# Patient Record
Sex: MALE | Race: WHITE
[De-identification: names, ages, dates, MRNs, and addresses within clinical notes are randomized per-mention and may not be internally consistent; named-entity substitution may affect disease eponyms.]

---

## 2019-03-06 ENCOUNTER — HOSPITAL ENCOUNTER (INPATIENT)
Dept: HOSPITAL 97 - ER | Age: 64
LOS: 5 days | Discharge: HOME | DRG: 439 | End: 2019-03-11
Attending: FAMILY MEDICINE | Admitting: FAMILY MEDICINE
Payer: SELF-PAY

## 2019-03-06 DIAGNOSIS — K85.20: Primary | ICD-10-CM

## 2019-03-06 DIAGNOSIS — J44.9: ICD-10-CM

## 2019-03-06 DIAGNOSIS — K44.9: ICD-10-CM

## 2019-03-06 DIAGNOSIS — I10: ICD-10-CM

## 2019-03-06 DIAGNOSIS — K76.0: ICD-10-CM

## 2019-03-06 DIAGNOSIS — K21.9: ICD-10-CM

## 2019-03-06 DIAGNOSIS — R09.02: ICD-10-CM

## 2019-03-06 DIAGNOSIS — F17.210: ICD-10-CM

## 2019-03-06 DIAGNOSIS — K86.0: ICD-10-CM

## 2019-03-06 DIAGNOSIS — F10.188: ICD-10-CM

## 2019-03-06 LAB
ALBUMIN SERPL BCP-MCNC: 3.9 G/DL (ref 3.4–5)
ALP SERPL-CCNC: 110 U/L (ref 45–117)
ALT SERPL W P-5'-P-CCNC: 24 U/L (ref 12–78)
AST SERPL W P-5'-P-CCNC: 26 U/L (ref 15–37)
BUN BLD-MCNC: 4 MG/DL (ref 7–18)
GLUCOSE SERPLBLD-MCNC: 116 MG/DL (ref 74–106)
HCT VFR BLD CALC: 38 % (ref 39.6–49)
INR BLD: 0.96
LIPASE SERPL-CCNC: 7494 U/L (ref 73–393)
LYMPHOCYTES # SPEC AUTO: 1.7 K/UL (ref 0.7–4.9)
MAGNESIUM SERPL-MCNC: 1.9 MG/DL (ref 1.8–2.4)
METHAMPHET UR QL SCN: NEGATIVE
NT-PROBNP SERPL-MCNC: 68 PG/ML (ref ?–125)
PMV BLD: 7.9 FL (ref 7.6–11.3)
POTASSIUM SERPL-SCNC: 3.5 MMOL/L (ref 3.5–5.1)
RBC # BLD: 4.53 M/UL (ref 4.33–5.43)
THC SERPL-MCNC: NEGATIVE NG/ML
TROPONIN (EMERG DEPT USE ONLY): < 0.02 NG/ML (ref 0–0.04)
TSH SERPL DL<=0.05 MIU/L-ACNC: 0.5 UIU/ML (ref 0.36–3.74)

## 2019-03-06 PROCEDURE — 85025 COMPLETE CBC W/AUTO DIFF WBC: CPT

## 2019-03-06 PROCEDURE — 84439 ASSAY OF FREE THYROXINE: CPT

## 2019-03-06 PROCEDURE — 80320 DRUG SCREEN QUANTALCOHOLS: CPT

## 2019-03-06 PROCEDURE — 80053 COMPREHEN METABOLIC PANEL: CPT

## 2019-03-06 PROCEDURE — 80307 DRUG TEST PRSMV CHEM ANLYZR: CPT

## 2019-03-06 PROCEDURE — 83735 ASSAY OF MAGNESIUM: CPT

## 2019-03-06 PROCEDURE — 80061 LIPID PANEL: CPT

## 2019-03-06 PROCEDURE — 81003 URINALYSIS AUTO W/O SCOPE: CPT

## 2019-03-06 PROCEDURE — 96361 HYDRATE IV INFUSION ADD-ON: CPT

## 2019-03-06 PROCEDURE — 87040 BLOOD CULTURE FOR BACTERIA: CPT

## 2019-03-06 PROCEDURE — 80048 BASIC METABOLIC PNL TOTAL CA: CPT

## 2019-03-06 PROCEDURE — 99285 EMERGENCY DEPT VISIT HI MDM: CPT

## 2019-03-06 PROCEDURE — 85610 PROTHROMBIN TIME: CPT

## 2019-03-06 PROCEDURE — 96374 THER/PROPH/DIAG INJ IV PUSH: CPT

## 2019-03-06 PROCEDURE — 93005 ELECTROCARDIOGRAM TRACING: CPT

## 2019-03-06 PROCEDURE — 74177 CT ABD & PELVIS W/CONTRAST: CPT

## 2019-03-06 PROCEDURE — 84484 ASSAY OF TROPONIN QUANT: CPT

## 2019-03-06 PROCEDURE — 36415 COLL VENOUS BLD VENIPUNCTURE: CPT

## 2019-03-06 PROCEDURE — 80076 HEPATIC FUNCTION PANEL: CPT

## 2019-03-06 PROCEDURE — 71045 X-RAY EXAM CHEST 1 VIEW: CPT

## 2019-03-06 PROCEDURE — 84145 PROCALCITONIN (PCT): CPT

## 2019-03-06 PROCEDURE — 71046 X-RAY EXAM CHEST 2 VIEWS: CPT

## 2019-03-06 PROCEDURE — 96375 TX/PRO/DX INJ NEW DRUG ADDON: CPT

## 2019-03-06 PROCEDURE — 83690 ASSAY OF LIPASE: CPT

## 2019-03-06 PROCEDURE — 84443 ASSAY THYROID STIM HORMONE: CPT

## 2019-03-06 PROCEDURE — 83880 ASSAY OF NATRIURETIC PEPTIDE: CPT

## 2019-03-06 RX ADMIN — HYDROMORPHONE HYDROCHLORIDE PRN MG: 1 INJECTION, SOLUTION INTRAMUSCULAR; INTRAVENOUS; SUBCUTANEOUS at 17:56

## 2019-03-06 RX ADMIN — NICOTINE SCH MG: 21 PATCH TRANSDERMAL at 11:34

## 2019-03-06 RX ADMIN — ARFORMOTEROL TARTRATE SCH MCG: 15 SOLUTION RESPIRATORY (INHALATION) at 21:40

## 2019-03-06 RX ADMIN — SODIUM CHLORIDE SCH MLS: 0.9 INJECTION, SOLUTION INTRAVENOUS at 11:34

## 2019-03-06 RX ADMIN — HYDROMORPHONE HYDROCHLORIDE PRN MG: 1 INJECTION, SOLUTION INTRAMUSCULAR; INTRAVENOUS; SUBCUTANEOUS at 21:47

## 2019-03-06 RX ADMIN — FAMOTIDINE SCH MG: 10 INJECTION, SOLUTION INTRAVENOUS at 21:16

## 2019-03-06 RX ADMIN — Medication SCH: at 21:00

## 2019-03-06 RX ADMIN — SODIUM CHLORIDE SCH MLS: 0.9 INJECTION, SOLUTION INTRAVENOUS at 17:55

## 2019-03-06 NOTE — RAD REPORT
EXAM DESCRIPTION:  CTAbdomen   Pelvis W Contrast - 3/6/2019 7:58 am

 

CLINICAL HISTORY:  Abdominal pain.

iv only;Abd pain

 

COMPARISON:  No comparisons

 

TECHNIQUE:  Biphasic CT imaging of the abdomen and pelvis was performed with 100 ml non-ionic IV cont
rast.

 

All CT scans are performed using dose optimization technique as appropriate and may include automated
 exposure control or mA/KV adjustment according to patient size.

 

FINDINGS:  Emphysematous lung bases are present.Small hiatal hernia is noted.

 

Mild fatty liver is present. The spleen, adrenal glands and kidneys are within normal limits. Moderat
e edema and fluid is seen surrounding the pancreas compatible with acute pancreatitis. Calcifications
 are seen in the region the pancreatic head which could be within the pancreatic duct. Few small low-
density collections in the pancreatic neck would represent dilated side branches. No evidence of panc
reatic necrosis or portal vein thrombus.

 

No bowel obstruction, free air, free fluid or abscess. Aortic atherosclerosis. The appendix is normal
.  No evidence of significant lymphadenopathy.

 

No suspicious bony findings.

 

IMPRESSION:  Moderate acute pancreatitis.

## 2019-03-06 NOTE — ER
Nurse's Notes                                                                                     

 [facility]                                                                

Name: Dandy Garnica                                                                                  

Age: 63 yrs                                                                                       

Sex: Male                                                                                         

: 1955                                                                                   

MRN: [id number]                                                                                   

Arrival Date: 2019                                                                          

Time: 06:27                                                                                       

Account#: [id number]                                                                            

Bed 3                                                                                             

Private MD:                                                                                       

Diagnosis: Acute pancreatitis                                                                     

                                                                                                  

Presentation:                                                                                     

                                                                                             

06:36 Presenting complaint: Patient states: I woke up around 4am with severe abdominal pain   ed1 

      and now my chest is hurting. Transition of care: patient was not received from another      

      setting of care. Onset of symptoms was 2019 at 04:00. Risk Assessment: Do you     

      want to hurt yourself or someone else? Patient reports no desire to harm self or            

      others. Initial Sepsis Screen: Does the patient meet any 2 criteria? No. Patient's          

      initial sepsis screen is negative. Does the patient have a suspected source of              

      infection? No. Patient's initial sepsis screen is negative. Care prior to arrival: None.    

06:36 Method Of Arrival: Wheelchair                                                           ed1 

06:36 Acuity: LISA 2                                                                           ed1 

                                                                                                  

Triage Assessment:                                                                                

06:38 General: Appears distressed, Behavior is agitated, restless. Pain: Complains of pain in ed1 

      abdomen Pain radiates to chest Pain currently is 10 out of 10 on a pain scale. Quality      

      of pain is described as burning, stabbing, Pain began 2 hours ago. Cardiovascular:          

      Reports chest pain.                                                                         

                                                                                                  

Historical:                                                                                       

- Allergies:                                                                                      

06:38 No Known Allergies;                                                                     ed1 

- Home Meds:                                                                                      

06:38 Unable to obtain [Active];                                                              ed1 

- PMHx:                                                                                           

06:38 Hypertension; COPD; back pain; Pancreatitis;                                            ed1 

- PSHx:                                                                                           

06:38 None;                                                                                   ed1 

                                                                                                  

- Immunization history:: Adult Immunizations up to date, Flu vaccine is not up to date.           

- Social history:: Smoking status: Patient uses tobacco products, smokes one-half pack            

  cigarettes per day.                                                                             

- Ebola Screening: : Patient negative for fever greater than or equal to 101.5 degrees            

  Fahrenheit, and additional compatible Ebola Virus Disease symptoms Patient denies               

  exposure to infectious person Patient denies travel to an Ebola-affected area in the            

  21 days before illness onset No symptoms or risks identified at this time.                      

                                                                                                  

                                                                                                  

Screenin:06 Abuse screen: Denies threats or abuse. Nutritional screening: No deficits noted.        tl2 

      Tuberculosis screening: No symptoms or risk factors identified. Fall Risk IV access (20     

      points).                                                                                    

                                                                                                  

Assessment:                                                                                       

07:06 General: Appears distressed, uncomfortable, Behavior is agitated, anxious. Pain:        tl2 

      Complains of pain in left upper quadrant and right upper quadrant and epigastric area       

      and chest. Neuro: Level of Consciousness is awake, alert, obeys commands, Oriented to       

      person, place, time, situation. Cardiovascular: Chest pain is described as diffuse,         

      quality is sharp. Respiratory: Airway is patent Respiratory effort is even, unlabored,      

      Respiratory pattern is regular, symmetrical. GI: Abdomen is non-distended, Abd is soft      

      Abdomen is tender to palpation in right upper quadrant and left upper quadrant. Derm:       

      Skin is pink, warm \T\ dry.                                                                 

07:15 General: Appears uncomfortable, Behavior is cooperative. Pain: Complains of pain in     aa5 

      right upper quadrant and left upper quadrant, chest, and lower back Pain currently is       

      10 out of 10 on a pain scale. Quality of pain is described as sharp, Pain began around      

      0400 Is continuous. Neuro: Level of Consciousness is awake, alert, obeys commands,          

      Oriented to person, place, time, situation,  are equal bilaterally Moves all           

      extremities. Speech is normal, Facial symmetry appears normal, Pupils are PERRLA.           

      Cardiovascular: Heart tones S1 S2 present Rhythm is regular. Respiratory: Airway is         

      patent Respiratory effort is even, unlabored, Respiratory pattern is regular,               

      symmetrical. GI: Abdomen is non-distended, Bowel sounds present X 4 quads. Abdomen is       

      tender to palpation in right upper quadrant and left upper quadrant Reports nausea,         

      vomiting, Patient currently denies diarrhea. : No signs and/or symptoms were reported     

      regarding the genitourinary system. EENT: No signs and/or symptoms were reported            

      regarding the EENT system. Derm: Skin is pink, warm \T\ dry. Musculoskeletal: Range of      

      motion: intact in all extremities.                                                          

07:15 Reassessment: Pt reports he drinks a "6 pack a day", reports last drink was last night, aa5 

      smells of alcohol .                                                                         

07:40 Reassessment: Patient is alert, oriented x 3, equal unlabored respirations, skin        aa5 

      warm/dry/pink. Patient states symptoms have not improved. PA notified of unchanged pain     

      level. . General: Appears uncomfortable.                                                    

07:49 Reassessment: Pt taken to CT via stretcher .                                            aa5 

08:10 Reassessment: Patient states feeling better. Pt now calm, resting in bed with eyes      aa5 

      closed, equal and unlabored respirations, skin is pink/warm/dry. Pain: Pain currently       

      is 6 out of 10 on a pain scale.                                                             

09:05 Reassessment: Dr. Patton (Hospitalist) at bedside discussing POC and NPO status with    aa5 

      patient. .                                                                                  

09:15 Reassessment: ETOH drawn and sent to lab .                                              aa5 

09:15 Reassessment: Pt resting in bed with eyes closed, respirations even and unlabored, skin aa5 

      is pink/warm/dry. .                                                                         

09:25 Reassessment: Patient and/or family updated on plan of care and expected duration. Pain aa5 

      level reassessed. Patient is alert, oriented x 3, equal unlabored respirations, skin        

      warm/dry/pink. Awaiting room assignment, pt notified of wait time. .                        

10:50 Reassessment: Patient is alert, oriented x 3, equal unlabored respirations, skin        aa5 

      warm/dry/pink.                                                                              

                                                                                                  

Vital Signs:                                                                                      

06:38  / 83; Pulse 75; Resp 22; Pulse Ox 98% on R/A; Weight 63.5 kg; Height 5 ft. 10    ed1 

      in. (177.80 cm); Pain 10/10;                                                                

07:05  / 93; Pulse 99; Resp 18 S; Temp 97.6(O); Pulse Ox 99% on R/A; Pain 10/10;        aa5 

07:40  / 76; Pulse 98; Resp 18 S; Pulse Ox 99% on R/A; Pain 10/10;                      aa5 

08:10 Pulse Ox 78% on R/A;                                                                    aa5 

08:11 Pulse Ox 93% on 2 lpm NC;                                                               aa5 

08:12  / 83; Pulse 102; Resp 16 S; Pulse Ox 99% on 2 lpm NC; Pain 6/10;                 aa5 

09:20  / 79; Pulse 104; Resp 16 S; Pulse Ox 100% on 2 lpm NC;                           aa5 

10:30  / 80; Pulse 105; Resp 18 S; Pulse Ox 99% on 2 lpm NC; Pain 6/10;                 aa5 

06:38 Body Mass Index 20.09 (63.50 kg, 177.80 cm)                                             ed1 

                                                                                                  

Vitals:                                                                                           

07:06 Cardiac Rhythm Assessment Sinus rhythm.                                                 tl2 

                                                                                                  

ED Course:                                                                                        

06:27 Patient arrived in ED.                                                                  am2 

06:36 [facility]                                                               [id number] 

06:36 Dino Medley MD is Attending Physician.                                             [id number] 

06:37 Triage completed.                                                                       ed1 

06:40 Arm band placed on right wrist.                                                         ed1 

06:45 Inserted saline lock: 20 gauge in right antecubital area, using aseptic technique.      tl2 

      Blood collected.                                                                            

07:04 X-ray completed. Portable x-ray completed in exam room. Patient tolerated procedure     jb2 

      well.                                                                                       

07:05 XRAY Chest (1 view) In Process Unspecified.                                             EDMS

07:06 Patient has correct armband on for positive identification. Placed in gown. Bed in low  tl2 

      position. Call light in reach. Side rails up X 1. Cardiac monitor on. Pulse ox on. NIBP     

      on.                                                                                         

07:06 Patient maintains SpO2 saturation greater than 95% on room air.                         tl2 

07:15 Report received from HERBER Jones.                                                         aa5 

07:47 Gisele Jorge RN is Primary Nurse.                                                   aa5 

07:49 Patient moved to CT via stretcher.                                                      jg6 

07:57 CT completed. Patient tolerated procedure well. Patient moved back from CT.             jg6 

07:59 CT Abd/Pelvis - W/Contrast In Process Unspecified.                                      EDMS

08:28 Sohail Patton DO is Hospitalizing Provider.                                           jr8 

08:30 No provider procedures requiring assistance completed.                                  aa5 

10:50 Patient admitted, IV remains in place.                                                  aa5 

                                                                                                  

Administered Medications:                                                                         

07:03 Drug: NS 0.9% 1000 ml Route: IV; Rate: 1000 ml; Site: right antecubital;                tl2 

08:34 Follow up: IV Status: Completed infusion                                                aa5 

07:04 Drug: Demerol 50 mg Route: IVP; Site: right antecubital;                                tl2 

07:40 Follow up: Response: No adverse reaction; Pain is unchanged, physician notified         aa5 

07:04 Drug: Zofran 4 mg Route: IVP; Site: right antecubital;                                  tl2 

07:40 Follow up: Response: No adverse reaction; Nausea is decreased                           aa5 

07:44 Drug: Dilaudid 1 mg Route: IVP; Site: right antecubital;                                aa5 

07:48 Follow up: Response: No adverse reaction                                                aa5 

08:35 Drug: NS 0.9% 1000 ml Route: IV; Rate: 100 ml/hr; Site: right antecubital;              aa5 

10:55 Follow up: IV Status: Infusion continued upon admission                                 aa5 

                                                                                                  

                                                                                                  

Intake:                                                                                           

09:25 UDS sent to lab. Urine is clear yellow.                                                 aa5 

                                                                                                  

Output:                                                                                           

09:25 Urine: 400ml (Voided); Total: 400ml.                                                    aa5 

09:25 UDS sent to lab. Urine is clear yellow.                                                 aa5 

                                                                                                  

Outcome:                                                                                          

08:28 Decision to Hospitalize by Provider.                                                    mayela 

10:50 Admitted to Med/surg accompanied by tech, via stretcher, with oxygen, with chart,       aa5 

      Report called to  HERBER Mayberry                                                                 

10:50 Condition: stable                                                                           

10:50 Instructed on the need for admit, Demonstrated understanding of instructions.               

10:59 Patient left the ED.                                                                    iw  

                                                                                                  

Signatures:                                                                                       

Dispatcher [employer]                           EDMS                                                 

Caden Fierro                              jb2                                                  

Alaina Baldwin RN                     RN   iw                                                   

Gisele Jorge RN                     RN   aa5                                                  

Yanique Wagoner RN RN   ed1                                                  

Sebastián Fairchild PA PA jr8                                                  

Karen Lenz RN                        RN   [id number]                                                  

Nuzhat Valenzuela Jessica j6                                                  

                                                                                                  

**************************************************************************************************

## 2019-03-06 NOTE — P.HP
Certification for Inpatient


Patient admitted to: Inpatient


With expected LOS: >2 Midnights


Practitioner: I am a practitioner with admitting privileges, knowledge of 

patient current condition, hospital course, and medical plan of care.


Services: Services provided to patient in accordance with Admission 

requirements found in Title 42 Section 412.3 of the Code of Federal Regulations





Patient History


Date of Service: 03/06/19


Primary Care Provider: None


Reason for admission: Abdominal pain


History of Present Illness: 





63-year-old  male presented to the emergency room with abdominal pain, 

nausea and vomiting.  Abdominal pain mainly to the epigastric region.  

Radiating to the back.  Pain was severe.  Patient with history of COPD, 

alcoholic pancreatitis, hypertension, GERD, tobacco and alcohol abuse.  Patient 

came to the ER for further evaluation.  Patient denied any fever, chills, chest 

pain or shortness of breath.





In the ER patient evaluated.  White count 14.9, hemoglobin 12.6.  Lipase level 

was elevated at 7484. Sodium 141, potassium 3.5, BUN of 4, creatinine 0.7 with 

a GFR of 90.  Glucose 116.  AST and ALT within normal range.  Total bilirubin 

within normal range.  Troponin less than 0.0 2.  CT revealed moderate 

pancreatitis, small hiatal hernia and fatty liver.  Chest x-ray showed COPD 

changes.  Patient was admitted for treatment.





In the ER when I evaluated the patient, pain seemed to be better controlled.  

No significant nausea vomiting noted. Patient admits drinking alcohol and 

primarily beer about 6 beers per day.  He he reports no prior history of 

alcohol withdrawal.  He still smokes as well.  Patient reports prior history of 

pancreatitis in the past.  He has taken blood pressure medication in the past 

but no longer takes this.  He does not take any inhalers for his COPD.





- Past Medical/Surgical History


Diabetic: No


-: Hypertension


-: COPD


-: GERD


-: Tobacco/alcohol abuse


-: History of alcoholic pancreatitis


Past Surgical History: Patient denies surgical history


Psychosocial/ Personal History: Patient is a .  He has 1 child.  He does 

not work.





- Family History


Family History: Reviewed- Non-Contributory





- Social History


Smoking Status: Light Tobacco smoker (1-9 cigarettes/day)


Counseled patient to stop smoking for: less than 10 minutes


Smoking therapy provided: Yes


Patient receptive to therapy: Yes


Alcohol use: Yes


CD- Drugs: No


Caffeine use: Yes


Place of Residence: Home





Review of Systems


General: As per HPI


Eyes: Unremarkable


ENT: Unremarkable


Respiratory: Unremarkable


Cardiovascular: Unremarkable


Gastrointestinal: Nausea, Vomiting, Abdominal Pain, As per HPI


Genitourinary: Unremarkable


Musculoskeletal: Back Pain, As per HPI


Integumentary: Unremarkable


Neurological: Unremarkable


Lymphatics: Unremarkable





Physical Examination





- Physical Exam


General: Alert, In no apparent distress, Oriented x3, Cooperative


HEENT: Atraumatic, Normocephalic, PERRLA, Mucous membr. moist/pink


Neck: Supple, No Thyromegaly


Respiratory: Clear to auscultation bilaterally, Normal air movement


Cardiovascular: Abnormal pulses (Sinus tachycardia)


Gastrointestinal: Normal bowel sounds, Soft and benign, Non-distended, No masses

, No rebound, Tenderness (Tenderness to the epigastric region with palpation), 

Guarding (Mild guarding noted)


Musculoskeletal: No erythema, No tenderness, No warmth


Integumentary: No tenderness/swelling, No erythema, No warmth, No cyanosis


Neurological: Normal speech, Normal strength at 5/5 x4 extr, Normal tone, 

Normal affect


Lymphatics: No axilla or inguinal lymphadenopathy





- Studies


Laboratory Data (last 24 hrs)





03/06/19 06:45: PT 11.4, INR 0.96


03/06/19 06:45: WBC 14.9 H, Hgb 12.6 L, Hct 38.0 L, Plt Count 406


03/06/19 06:45: Sodium 141, Potassium 3.5, BUN 4 L, Creatinine 0.71, Glucose 

116 H, Magnesium 1.9, Total Bilirubin 0.2, AST 26, ALT 24, Alkaline Phosphatase 

110, Lipase 7494 H








Assessment and Plan





- Plan





Impression:


Epigastric abdominal pain, nausea and vomiting secondary to acute, recurrent 

alcoholic pancreatitis


COPD


Hypertension


GERD


Hiatal hernia


Fatty liver


Tobacco/alcohol abuse





Plan:


Epigastric abdominal pain, nausea and vomiting secondary to acute, recurrent 

alcoholic pancreatitis:  Patient will be admitted.  Will keep the patient NPO.  

Will continue with aggressive IV fluid hydration.  Will obtain blood cultures 

and urinalysis.  Will check alcohol level and urine drug screen.  Encourage 

incentive spirometer.  Will continue to monitor the patient closely will 

advance diet once without significant pain. On alcohol and tobacco cessation.  

Will consult GI for further recommendation.  Will continue to monitor serial 

exams and lab.


COPD:  Will start COPD medication.  Anticipate the need for COPD medication at 

discharge.


Hypertension:  Will provide medication IV.  Will transition to oral medication 

once he is able to take oral intake.


GERD:  Will provide IV medication


Hiatal hernia:  Will start with IV medication


Fatty liver:  Will address education at discharge.


Tobacco/alcohol abuse:  Continue IV fluids.  Tobacco/alcohol cessation 

addressed in detail.  Will need to monitor for alcohol withdrawal.  Patient 

drinks 6 beers per day.  No history of prior alcohol withdrawal.  Will need to 

monitor this closely.  Will provide Ativan as needed.  Continue to address 

cessation.  Patient may require nicotine patch.  Will provide banana bag IV.


Discharge Plan: Home


Plan to discharge in: Greater than 2 days





- Advance Directives


Does patient have a Living Will: No


Does patient have a Durable POA for Healthcare: No





- Code Status/Comfort Care


Code Status Assessed: Yes (Patient full code.)


Time Spent Managing Pts Care (In Minutes): 55

## 2019-03-06 NOTE — EDPHYS
Physician Documentation                                                                           

 [facility]                                                                

Name: Dandy Garnica                                                                                  

Age: 63 yrs                                                                                       

Sex: Male                                                                                         

: 1955                                                                                   

MRN: [id number]                                                                                   

Arrival Date: 2019                                                                          

Time: 06:27                                                                                       

Account#: [id number]                                                                            

Bed 3                                                                                             

Private MD:                                                                                       

ED Physician Dino Medley                                                                      

HPI:                                                                                              

                                                                                             

06:51 This 63 yrs old  Male presents to ER via Wheelchair with complaints of Chest   jr8 

      Pain > 33 y/o, Abdominal Pain.                                                              

06:51 Onset: acutely, today. Associated signs and symptoms: Pertinent positives: nausea,      jr8 

      vomiting. Duration: The patient or guardian reports a single episode, that is still         

      ongoing, and unchanged. The patient has experienced similar episodes in the past, a few     

      times. The patient has not recently seen a physician. Patient stated that he has had        

      history of pancreatitis. Started with severe upper abdominal pain this AM that is going     

      to chest. Stated that he has had this in the past and was found to have acute               

      pancreatitis. Had alcohol last night .                                                      

                                                                                                  

Historical:                                                                                       

- Allergies:                                                                                      

06:38 No Known Allergies;                                                                     ed1 

- Home Meds:                                                                                      

06:38 Unable to obtain [Active];                                                              ed1 

- PMHx:                                                                                           

06:38 Hypertension; COPD; back pain; Pancreatitis;                                            ed1 

- PSHx:                                                                                           

06:38 None;                                                                                   ed1 

                                                                                                  

- Immunization history:: Adult Immunizations up to date, Flu vaccine is not up to date.           

- Social history:: Smoking status: Patient uses tobacco products, smokes one-half pack            

  cigarettes per day.                                                                             

- Ebola Screening: : Patient negative for fever greater than or equal to 101.5 degrees            

  Fahrenheit, and additional compatible Ebola Virus Disease symptoms Patient denies               

  exposure to infectious person Patient denies travel to an Ebola-affected area in the            

  21 days before illness onset No symptoms or risks identified at this time.                      

                                                                                                  

                                                                                                  

ROS:                                                                                              

06:51 Eyes: Negative for injury, pain, redness, and discharge, ENT: Negative for injury,      jr8 

      pain, and discharge, Neck: Negative for injury, pain, and swelling, Respiratory:            

      Negative for shortness of breath, cough, wheezing, and pleuritic chest pain, Back:          

      Negative for injury and pain, MS/Extremity: Negative for injury and deformity, Skin:        

      Negative for injury, rash, and discoloration, Neuro: Negative for headache, weakness,       

      numbness, tingling, and seizure.                                                            

06:51 Cardiovascular: Positive for chest pain, Negative for edema, orthopnea, palpitations,       

      paroxysmal nocturnal dyspnea.                                                               

06:51 Abdomen/GI: Positive for abdominal pain, nausea and vomiting, Negative for diarrhea,        

      constipation, abdominal cramps, abdominal distension, anorexia, dysphagia, hematemesis,     

      black/tarry stool, rectal pain, rectal bleeding, bowel incontinence, flatulence.            

                                                                                                  

Exam:                                                                                             

06:51 Eyes:  Pupils equal round and reactive to light, extra-ocular motions intact.  Lids and jr8 

      lashes normal.  Conjunctiva and sclera are non-icteric and not injected.  Cornea within     

      normal limits.  Periorbital areas with no swelling, redness, or edema. ENT:  Nares          

      patent. No nasal discharge, no septal abnormalities noted.  Tympanic membranes are          

      normal and external auditory canals are clear.  Oropharynx with no redness, swelling,       

      or masses, exudates, or evidence of obstruction, uvula midline.  Mucous membranes           

      moist. Neck:  Trachea midline, no thyromegaly or masses palpated, and no cervical           

      lymphadenopathy.  Supple, full range of motion without nuchal rigidity, or vertebral        

      point tenderness.  No Meningismus. Cardiovascular:  Regular rate and rhythm with a          

      normal S1 and S2.  No gallops, murmurs, or rubs.  Normal PMI, no JVD.  No pulse             

      deficits. Respiratory:  Lungs have equal breath sounds bilaterally, clear to                

      auscultation and percussion.  No rales, rhonchi or wheezes noted.  No increased work of     

      breathing, no retractions or nasal flaring. Back:  No spinal tenderness.  No                

      costovertebral tenderness.  Full range of motion. Skin:  Warm, dry with normal turgor.      

      Normal color with no rashes, no lesions, and no evidence of cellulitis. MS/ Extremity:      

      Pulses equal, no cyanosis.  Neurovascular intact.  Full, normal range of motion. Neuro:     

       Awake and alert, GCS 15, oriented to person, place, time, and situation.  Cranial          

      nerves II-XII grossly intact.  Motor strength 5/5 in all extremities.  Sensory grossly      

      intact.  Cerebellar exam normal.  Normal gait.                                              

06:51 Abdomen/GI: Inspection: abdomen appears normal, Bowel sounds: active, all quadrants,        

      Palpation: soft, in all quadrants, moderate abdominal tenderness, in the epigastric         

      area, right upper quadrant and left upper quadrant, voluntary guarding, is elicited in      

      the epigastric area, right upper quadrant and left upper quadrant, involuntary              

      guarding, is not appreciated, no appreciated organomegaly, Indicators: McBurney's point     

      is not tender, Swain's sign is negative, Rovsing's sign is negative, Obturator sign is     

      negative.                                                                                   

                                                                                                  

Vital Signs:                                                                                      

06:38  / 83; Pulse 75; Resp 22; Pulse Ox 98% on R/A; Weight 63.5 kg; Height 5 ft. 10    ed1 

      in. (177.80 cm); Pain 10/10;                                                                

07:05  / 93; Pulse 99; Resp 18 S; Temp 97.6(O); Pulse Ox 99% on R/A; Pain 10/10;        aa5 

07:40  / 76; Pulse 98; Resp 18 S; Pulse Ox 99% on R/A; Pain 10/10;                      aa5 

08:10 Pulse Ox 78% on R/A;                                                                    aa5 

08:11 Pulse Ox 93% on 2 lpm NC;                                                               aa5 

08:12  / 83; Pulse 102; Resp 16 S; Pulse Ox 99% on 2 lpm NC; Pain 6/10;                 aa5 

09:20  / 79; Pulse 104; Resp 16 S; Pulse Ox 100% on 2 lpm NC;                           aa5 

10:30  / 80; Pulse 105; Resp 18 S; Pulse Ox 99% on 2 lpm NC; Pain 6/10;                 aa5 

06:38 Body Mass Index 20.09 (63.50 kg, 177.80 cm)                                             ed1 

                                                                                                  

MDM:                                                                                              

06:37 Patient medically screened.                                                             [facility] 

08:27 Data reviewed: vital signs, nurses notes, lab test result(s), EKG, radiologic studies,  jr8 

      CT scan. Data interpreted: Pulse oximetry: on room air is 99 %. Interpretation: normal.     

      Counseling: I had a detailed discussion with the patient and/or guardian regarding: the     

      historical points, exam findings, and any diagnostic results supporting the                 

      discharge/admit diagnosis, lab results, radiology results, the need for further work-up     

      and treatment in the hospital. Physician consultation: Sohail Patton DO was called at       

      08:28, was contacted at 08:28, regarding admission, to the telemetry unit. consult,         

      patient's condition, and will see patient.                                                  

                                                                                                  

                                                                                             

06:40 Order name: Basic Metabolic Panel; Complete Time: 07:45                                 jr8 

                                                                                             

06:40 Order name: CBC with Diff; Complete Time: 07:19                                                                                                                                      

06:40 Order name: LFT's; Complete Time: 07:45                                                                                                                                              

06:40 Order name: Magnesium; Complete Time: 07:45                                                                                                                                          

06:40 Order name: NT PRO-BNP; Complete Time: 07:45                                                                                                                                         

06:40 Order name: PT-INR; Complete Time: 07:19                                                                                                                                             

06:40 Order name: Troponin (emerg Dept Use Only); Complete Time: 07:45                                                                                                                     

06:40 Order name: XRAY Chest (1 view); Complete Time: 08:44                                                                                                                                

06:40 Order name: Lipase; Complete Time: 07:45                                                                                                                                             

07:19 Order name: CT Abd/Pelvis - W/Contrast; Complete Time: 08:25                                                                                                                         

08:59 Order name: ETOH Level; Complete Time: 10:06                                                                                                                                         

08:59 Order name: UDS; Complete Time: 10:06                                                                                                                                                

09:34 Order name: Urine Dipstick--Ancillary (enter results); Complete Time: 10:06             em1 

                                                                                             

06:40 Order name: EKG; Complete Time: 06:42                                                                                                                                                

06:40 Order name: Cardiac monitoring; Complete Time: 06:46                                                                                                                                 

06:40 Order name: EKG - Nurse/Tech; Complete Time: 06:46                                                                                                                                   

06:40 Order name: IV Saline Lock; Complete Time: 07:05                                                                                                                                     

06:40 Order name: Labs collected and sent; Complete Time: 07:05                                                                                                                            

06:40 Order name: O2 Per Protocol; Complete Time: 07:05                                                                                                                                    

06:40 Order name: O2 Sat Monitoring; Complete Time: 07:05                                      

                                                                                                  

Administered Medications:                                                                         

07:03 Drug: NS 0.9% 1000 ml Route: IV; Rate: 1000 ml; Site: right antecubital;                tl2 

08:34 Follow up: IV Status: Completed infusion                                                aa5 

07:04 Drug: Demerol 50 mg Route: IVP; Site: right antecubital;                                tl2 

07:40 Follow up: Response: No adverse reaction; Pain is unchanged, physician notified         aa5 

07:04 Drug: Zofran 4 mg Route: IVP; Site: right antecubital;                                  tl2 

07:40 Follow up: Response: No adverse reaction; Nausea is decreased                           aa5 

07:44 Drug: Dilaudid 1 mg Route: IVP; Site: right antecubital;                                aa5 

07:48 Follow up: Response: No adverse reaction                                                aa5 

08:35 Drug: NS 0.9% 1000 ml Route: IV; Rate: 100 ml/hr; Site: right antecubital;              aa5 

10:55 Follow up: IV Status: Infusion continued upon admission                                 aa5 

                                                                                                  

                                                                                                  

Disposition:                                                                                      

19 08:28 Hospitalization ordered by Sohail Patton for Inpatient Admission. Preliminary     

  diagnosis is Acute pancreatitis.                                                                

- Bed requested for Telemetry/MedSurg (Inpatient).                                                

- Status is Inpatient Admission.                                                              iw  

- Condition is Fair.                                                                              

- Problem is new.                                                                                 

- Symptoms have improved.                                                                         

UTI on Admission? No                                                                              

                                                                                                  

                                                                                                  

                                                                                                  

Addendum:                                                                                         

2019                                                                                        

     07:16 Co-signature as Attending Physician, Dino Medley MD I agree with the assessment and  c
ha

           plan of care.                                                                          

                                                                                                  

Signatures:                                                                                       

Dispatcher [employer]                           EDDino Simpson MD MD cha Williams, Irene, RN                     RN   iw                                                   

Mark Spear                               em1                                                  

Gisele Jorge RN                     RN   aa5                                                  

Yanique Wagoner RN RN   ed1                                                  

Sebastián Fairchild PA                        PA   jr8                                                  

Karen Lenz, RN                        RN   tl2                                                  

                                                                                                  

Corrections: (The following items were deleted from the chart)                                    

                                                                                             

10:08 08:28 Hospitalization Ordered by Sohail Patton DO for Inpatient Admission. Preliminary  em1 

      diagnosis is Acute pancreatitis. Bed requested for Telemetry/MedSurg (Inpatient).           

      Status is Inpatient Admission. Condition is Fair. Problem is new. Symptoms have             

      improved. UTI on Admission? No. jr8                                                         

10:59 10:08 2019 08:28 Hospitalization Ordered by Sohail Patton DO for Inpatient        iw  

      Admission. Preliminary diagnosis is Acute pancreatitis. Bed requested for                   

      Telemetry/MedSurg (Inpatient). Status is Inpatient Admission. Condition is Fair.            

      Problem is new. Symptoms have improved. UTI on Admission? No. em1                           

                                                                                                  

**************************************************************************************************

## 2019-03-06 NOTE — EKG
Test Date:    2019-03-06               Test Time:    06:35:20

Technician:   KATHERINE                                     

                                                     

MEASUREMENT RESULTS:                                       

Intervals:                                           

Rate:         81                                     

CT:           132                                    

QRSD:         92                                     

QT:           432                                    

QTc:          501                                    

Axis:                                                

P:            72                                     

CT:           132                                    

QRS:          69                                     

T:            60                                     

                                                     

INTERPRETIVE STATEMENTS:                                       

                                                     

Normal sinus rhythm with sinus arrhythmia

Prolonged QT

Abnormal ECG

No previous ECG available for comparison



Electronically Signed On 03-06-19 12:33:37 CST by Dwaine García

## 2019-03-06 NOTE — RAD REPORT
EXAM DESCRIPTION:  RAD - Chest Single View - 3/6/2019 7:06 am

 

CLINICAL HISTORY:  CHEST PAIN

Chest pain.

 

COMPARISON:  No comparisons

 

FINDINGS:  Portable technique limits examination quality.

 

Emphysematous changes are present throughout the lungs. No focal infiltrate detected. The heart is no
rmal in size. No displaced fractures.

 

IMPRESSION:  Prominent COPD.

## 2019-03-07 LAB
ALBUMIN SERPL BCP-MCNC: 3.4 G/DL (ref 3.4–5)
ALP SERPL-CCNC: 119 U/L (ref 45–117)
ALT SERPL W P-5'-P-CCNC: 18 U/L (ref 12–78)
AST SERPL W P-5'-P-CCNC: 17 U/L (ref 15–37)
BUN BLD-MCNC: 6 MG/DL (ref 7–18)
GLUCOSE SERPLBLD-MCNC: 106 MG/DL (ref 74–106)
HCT VFR BLD CALC: 38.2 % (ref 39.6–49)
HDLC SERPL-MCNC: 52 MG/DL (ref 40–60)
LDLC SERPL CALC-MCNC: 171 MG/DL (ref ?–130)
LYMPHOCYTES # SPEC AUTO: 1 K/UL (ref 0.7–4.9)
MAGNESIUM SERPL-MCNC: 1.9 MG/DL (ref 1.8–2.4)
PMV BLD: 8.2 FL (ref 7.6–11.3)
POTASSIUM SERPL-SCNC: 4.2 MMOL/L (ref 3.5–5.1)
RBC # BLD: 4.49 M/UL (ref 4.33–5.43)

## 2019-03-07 RX ADMIN — Medication SCH ML: at 20:48

## 2019-03-07 RX ADMIN — HYDROMORPHONE HYDROCHLORIDE PRN MG: 1 INJECTION, SOLUTION INTRAMUSCULAR; INTRAVENOUS; SUBCUTANEOUS at 06:02

## 2019-03-07 RX ADMIN — HYDROMORPHONE HYDROCHLORIDE PRN MG: 1 INJECTION, SOLUTION INTRAMUSCULAR; INTRAVENOUS; SUBCUTANEOUS at 10:21

## 2019-03-07 RX ADMIN — ARFORMOTEROL TARTRATE SCH MCG: 15 SOLUTION RESPIRATORY (INHALATION) at 07:22

## 2019-03-07 RX ADMIN — FAMOTIDINE SCH MG: 10 INJECTION, SOLUTION INTRAVENOUS at 20:48

## 2019-03-07 RX ADMIN — SODIUM CHLORIDE SCH MLS: 9 INJECTION, SOLUTION INTRAVENOUS at 09:30

## 2019-03-07 RX ADMIN — SODIUM CHLORIDE SCH: 0.9 INJECTION, SOLUTION INTRAVENOUS at 11:17

## 2019-03-07 RX ADMIN — SODIUM CHLORIDE SCH MLS: 0.9 INJECTION, SOLUTION INTRAVENOUS at 01:43

## 2019-03-07 RX ADMIN — ENOXAPARIN SODIUM SCH MG: 40 INJECTION SUBCUTANEOUS at 09:31

## 2019-03-07 RX ADMIN — HYDROMORPHONE HYDROCHLORIDE PRN MG: 1 INJECTION, SOLUTION INTRAMUSCULAR; INTRAVENOUS; SUBCUTANEOUS at 04:04

## 2019-03-07 RX ADMIN — NICOTINE SCH MG: 21 PATCH TRANSDERMAL at 09:32

## 2019-03-07 RX ADMIN — ARFORMOTEROL TARTRATE SCH MCG: 15 SOLUTION RESPIRATORY (INHALATION) at 20:00

## 2019-03-07 RX ADMIN — HYDROMORPHONE HYDROCHLORIDE PRN MG: 1 INJECTION, SOLUTION INTRAMUSCULAR; INTRAVENOUS; SUBCUTANEOUS at 01:42

## 2019-03-07 RX ADMIN — Medication SCH ML: at 09:33

## 2019-03-07 RX ADMIN — HYDROMORPHONE HYDROCHLORIDE PRN MG: 1 INJECTION, SOLUTION INTRAMUSCULAR; INTRAVENOUS; SUBCUTANEOUS at 18:18

## 2019-03-07 RX ADMIN — FAMOTIDINE SCH MG: 10 INJECTION, SOLUTION INTRAVENOUS at 09:33

## 2019-03-07 RX ADMIN — IPRATROPIUM BROMIDE PRN MG: 0.5 SOLUTION RESPIRATORY (INHALATION) at 20:30

## 2019-03-07 RX ADMIN — HYDROMORPHONE HYDROCHLORIDE PRN MG: 1 INJECTION, SOLUTION INTRAMUSCULAR; INTRAVENOUS; SUBCUTANEOUS at 22:04

## 2019-03-07 RX ADMIN — HYDROMORPHONE HYDROCHLORIDE PRN MG: 1 INJECTION, SOLUTION INTRAMUSCULAR; INTRAVENOUS; SUBCUTANEOUS at 00:03

## 2019-03-07 RX ADMIN — SODIUM CHLORIDE SCH MLS: 0.9 INJECTION, SOLUTION INTRAVENOUS at 18:19

## 2019-03-07 RX ADMIN — HYDROMORPHONE HYDROCHLORIDE PRN MG: 1 INJECTION, SOLUTION INTRAMUSCULAR; INTRAVENOUS; SUBCUTANEOUS at 14:21

## 2019-03-07 NOTE — P.PN
Subjective


Date of Service: 03/07/19


Primary Care Provider: None


Chief Complaint: Abdominal pain


Subjective: Improving (Still with epigastric abdominal pain but improved.  No 

significant nausea vomiting.)





Physical Examination





- Vital Signs


Temperature: 98.3 F


Blood Pressure: 126/107


Pulse: 102


Respirations: 12


Pulse Ox (%): 97





- Physical Exam


General: Alert, In no apparent distress, Oriented x3, Cooperative


HEENT: Atraumatic


Neck: Supple


Respiratory: Clear to auscultation bilaterally, Normal air movement


Cardiovascular: Normal pulses, Regular rate/rhythm


Gastrointestinal: Normal bowel sounds, Soft and benign, Non-distended, No masses

, No rebound, No guarding, Tenderness (Still with epigastric pain but improved)


Musculoskeletal: No erythema, No tenderness, No warmth


Integumentary: No tenderness/swelling, No erythema, No warmth, No cyanosis


Neurological: Normal speech, Normal strength at 5/5 x4 extr, Normal tone, 

Normal affect





- Studies


Medications List Reviewed: Yes





Assessment & Plan


Discharge Plan: Home


Plan to discharge in: 72 Hours


Physician Review Additional Text: 





Impression:


Epigastric abdominal pain, nausea and vomiting secondary to acute, recurrent 

alcoholic pancreatitis


COPD


Hypertension


GERD


Hiatal hernia


Fatty liver


Tobacco/alcohol abuse





Plan:


Epigastric abdominal pain, nausea and vomiting secondary to acute, recurrent 

alcoholic pancreatitis:  Patient appears improved.  No significant nausea or 

vomiting.  Will continue to keep the patient NPO.  Continue IV fluid hydration.

  Once pain well controlled then will advance diet.  Recommend ambulation with 

incentive spirometer.  Case discussed with GI yesterday.  Alcohol cessation 

addressed in detail.  Patient understands this.  No evidence of alcohol 

withdrawal.  Will continue monitor closely.


COPD:  Will continue with COPD medication.  Anticipate the need for COPD 

medication at discharge.


Hypertension:  Will provide medication IV.  Will transition to oral medication 

once he is able to take oral intake.


GERD:  Will continue with IV medication


Hiatal hernia:  Will continue with IV medication


Fatty liver:  Will address education at discharge.


Tobacco/alcohol abuse:  Continue IV fluids.  Tobacco/alcohol cessation 

readdressed in detail.  Will need to monitor for alcohol withdrawal.  No 

alcohol withdrawal at this time.  Patient drinks 6 beers per day.  No history 

of prior alcohol withdrawal.  Will need to monitor this closely.  Will provide 

Ativan as needed.  Continue to address cessation.  Patient may require nicotine 

patch.  Will continue with banana bag IV.


Time Spent Managing Pts Care (In Minutes): 55

## 2019-03-08 LAB
ALBUMIN SERPL BCP-MCNC: 3.1 G/DL (ref 3.4–5)
ALP SERPL-CCNC: 97 U/L (ref 45–117)
ALT SERPL W P-5'-P-CCNC: 12 U/L (ref 12–78)
AST SERPL W P-5'-P-CCNC: 15 U/L (ref 15–37)
BUN BLD-MCNC: 5 MG/DL (ref 7–18)
GLUCOSE SERPLBLD-MCNC: 86 MG/DL (ref 74–106)
HCT VFR BLD CALC: 34.4 % (ref 39.6–49)
LYMPHOCYTES # SPEC AUTO: 1.3 K/UL (ref 0.7–4.9)
MAGNESIUM SERPL-MCNC: 1.7 MG/DL (ref 1.8–2.4)
PMV BLD: 8.7 FL (ref 7.6–11.3)
POTASSIUM SERPL-SCNC: 3.5 MMOL/L (ref 3.5–5.1)
RBC # BLD: 4.04 M/UL (ref 4.33–5.43)

## 2019-03-08 RX ADMIN — HYDROMORPHONE HYDROCHLORIDE PRN MG: 1 INJECTION, SOLUTION INTRAMUSCULAR; INTRAVENOUS; SUBCUTANEOUS at 23:58

## 2019-03-08 RX ADMIN — SODIUM CHLORIDE SCH MLS: 0.9 INJECTION, SOLUTION INTRAVENOUS at 14:37

## 2019-03-08 RX ADMIN — HYDROMORPHONE HYDROCHLORIDE PRN MG: 1 INJECTION, SOLUTION INTRAMUSCULAR; INTRAVENOUS; SUBCUTANEOUS at 20:01

## 2019-03-08 RX ADMIN — FAMOTIDINE SCH MG: 10 INJECTION, SOLUTION INTRAVENOUS at 20:01

## 2019-03-08 RX ADMIN — HYDROMORPHONE HYDROCHLORIDE PRN MG: 1 INJECTION, SOLUTION INTRAMUSCULAR; INTRAVENOUS; SUBCUTANEOUS at 14:37

## 2019-03-08 RX ADMIN — FAMOTIDINE SCH MG: 10 INJECTION, SOLUTION INTRAVENOUS at 10:20

## 2019-03-08 RX ADMIN — ARFORMOTEROL TARTRATE SCH MCG: 15 SOLUTION RESPIRATORY (INHALATION) at 20:00

## 2019-03-08 RX ADMIN — HYDROMORPHONE HYDROCHLORIDE PRN MG: 1 INJECTION, SOLUTION INTRAMUSCULAR; INTRAVENOUS; SUBCUTANEOUS at 06:16

## 2019-03-08 RX ADMIN — ENOXAPARIN SODIUM SCH MG: 40 INJECTION SUBCUTANEOUS at 09:38

## 2019-03-08 RX ADMIN — SODIUM CHLORIDE SCH MLS: 0.9 INJECTION, SOLUTION INTRAVENOUS at 20:02

## 2019-03-08 RX ADMIN — HYDROMORPHONE HYDROCHLORIDE PRN MG: 1 INJECTION, SOLUTION INTRAMUSCULAR; INTRAVENOUS; SUBCUTANEOUS at 10:22

## 2019-03-08 RX ADMIN — SODIUM CHLORIDE SCH MLS: 0.9 INJECTION, SOLUTION INTRAVENOUS at 01:55

## 2019-03-08 RX ADMIN — SODIUM CHLORIDE SCH MLS: 9 INJECTION, SOLUTION INTRAVENOUS at 09:33

## 2019-03-08 RX ADMIN — HYDROMORPHONE HYDROCHLORIDE PRN MG: 1 INJECTION, SOLUTION INTRAMUSCULAR; INTRAVENOUS; SUBCUTANEOUS at 01:55

## 2019-03-08 RX ADMIN — Medication SCH ML: at 20:02

## 2019-03-08 RX ADMIN — NICOTINE SCH MG: 21 PATCH TRANSDERMAL at 09:38

## 2019-03-08 RX ADMIN — Medication SCH ML: at 09:41

## 2019-03-08 RX ADMIN — ARFORMOTEROL TARTRATE SCH MCG: 15 SOLUTION RESPIRATORY (INHALATION) at 08:32

## 2019-03-08 NOTE — P.PN
Subjective


Date of Service: 03/08/19


Primary Care Provider: None


Chief Complaint: Abdominal pain


Subjective: Improving (Less pain noted. No significant nausea.)





Physical Examination





- Vital Signs


Temperature: 97.2 F


Blood Pressure: 139/78


Pulse: 102


Respirations: 18


Pulse Ox (%): 96





- Physical Exam


General: Alert, In no apparent distress, Oriented x3, Cooperative


HEENT: Atraumatic, Mucous membr. moist/pink


Neck: Supple, No Thyromegaly


Respiratory: Clear to auscultation bilaterally, Normal air movement


Cardiovascular: Normal pulses, Regular rate/rhythm


Gastrointestinal: Normal bowel sounds, Soft and benign, Non-distended, No masses

, No rebound, No guarding, Tenderness (Less pain to the epigastric region)


Musculoskeletal: No erythema, No tenderness, No warmth


Integumentary: No tenderness/swelling, No erythema, No warmth, No cyanosis


Neurological: Normal speech, Normal strength at 5/5 x4 extr, Normal tone, 

Normal affect





- Studies


Medications List Reviewed: Yes





Assessment & Plan


Discharge Plan: Home


Plan to discharge in: 48 Hours


Physician Review Additional Text: 





Impression:


Epigastric abdominal pain, nausea and vomiting secondary to acute, recurrent 

alcoholic pancreatitis


COPD


Hypertension


GERD


Hiatal hernia


Fatty liver


Tobacco/alcohol abuse





Plan:


Epigastric abdominal pain, nausea and vomiting secondary to acute, recurrent 

alcoholic pancreatitis:  Patient continues to improve.  No significant nausea 

or vomiting noted. Will reassess after noontime.  If pain improved will 

consider starting clear liquid diet.  Encourage ambulation.  Lipase improved.  

No evidence of alcohol withdrawal. 


COPD:  Will continue with COPD medication.  Anticipate the need for COPD 

medication at discharge.


Hypertension:  Will provide medication IV.  Will transition to oral medication 

once he is able to take oral intake.


GERD:  Will continue with IV medication


Hiatal hernia:  Will continue with IV medication


Fatty liver:  Will address education at discharge.


Tobacco/alcohol abuse:  Tobacco/alcohol cessation readdressed in detail.  Will 

need to monitor for alcohol withdrawal.  No alcohol withdrawal at this time.  

Patient drinks 6 beers per day.  No history of prior alcohol withdrawal.  

Patient doing well.  Patient understands risk of continued alcohol use.  

Continue with IV banana bag.


Time Spent Managing Pts Care (In Minutes): 55

## 2019-03-09 LAB
ALBUMIN SERPL BCP-MCNC: 3 G/DL (ref 3.4–5)
ALP SERPL-CCNC: 81 U/L (ref 45–117)
ALT SERPL W P-5'-P-CCNC: 12 U/L (ref 12–78)
AST SERPL W P-5'-P-CCNC: 14 U/L (ref 15–37)
BUN BLD-MCNC: 4 MG/DL (ref 7–18)
GLUCOSE SERPLBLD-MCNC: 79 MG/DL (ref 74–106)
HCT VFR BLD CALC: 29.7 % (ref 39.6–49)
LYMPHOCYTES # SPEC AUTO: 1.4 K/UL (ref 0.7–4.9)
MAGNESIUM SERPL-MCNC: 1.7 MG/DL (ref 1.8–2.4)
PMV BLD: 8.6 FL (ref 7.6–11.3)
POTASSIUM SERPL-SCNC: 3.5 MMOL/L (ref 3.5–5.1)
RBC # BLD: 3.5 M/UL (ref 4.33–5.43)

## 2019-03-09 RX ADMIN — IPRATROPIUM BROMIDE PRN MG: 0.5 SOLUTION RESPIRATORY (INHALATION) at 07:56

## 2019-03-09 RX ADMIN — HYDROMORPHONE HYDROCHLORIDE PRN MG: 1 INJECTION, SOLUTION INTRAMUSCULAR; INTRAVENOUS; SUBCUTANEOUS at 04:16

## 2019-03-09 RX ADMIN — HYDROMORPHONE HYDROCHLORIDE PRN MG: 1 INJECTION, SOLUTION INTRAMUSCULAR; INTRAVENOUS; SUBCUTANEOUS at 22:05

## 2019-03-09 RX ADMIN — NICOTINE SCH MG: 21 PATCH TRANSDERMAL at 08:30

## 2019-03-09 RX ADMIN — SODIUM CHLORIDE SCH MLS: 9 INJECTION, SOLUTION INTRAVENOUS at 11:07

## 2019-03-09 RX ADMIN — HYDROMORPHONE HYDROCHLORIDE PRN MG: 1 INJECTION, SOLUTION INTRAMUSCULAR; INTRAVENOUS; SUBCUTANEOUS at 12:48

## 2019-03-09 RX ADMIN — ARFORMOTEROL TARTRATE SCH MCG: 15 SOLUTION RESPIRATORY (INHALATION) at 07:55

## 2019-03-09 RX ADMIN — Medication SCH ML: at 08:41

## 2019-03-09 RX ADMIN — Medication SCH ML: at 21:22

## 2019-03-09 RX ADMIN — SODIUM CHLORIDE SCH MLS: 0.9 INJECTION, SOLUTION INTRAVENOUS at 19:38

## 2019-03-09 RX ADMIN — ENOXAPARIN SODIUM SCH MG: 40 INJECTION SUBCUTANEOUS at 08:40

## 2019-03-09 RX ADMIN — MOMETASONE FUROATE AND FORMOTEROL FUMARATE DIHYDRATE SCH PUFF: 100; 5 AEROSOL RESPIRATORY (INHALATION) at 21:21

## 2019-03-09 RX ADMIN — FAMOTIDINE SCH MG: 10 INJECTION, SOLUTION INTRAVENOUS at 08:30

## 2019-03-09 RX ADMIN — HYDROMORPHONE HYDROCHLORIDE PRN MG: 1 INJECTION, SOLUTION INTRAMUSCULAR; INTRAVENOUS; SUBCUTANEOUS at 17:32

## 2019-03-09 RX ADMIN — SODIUM CHLORIDE SCH MLS: 0.9 INJECTION, SOLUTION INTRAVENOUS at 04:14

## 2019-03-09 RX ADMIN — HYDROMORPHONE HYDROCHLORIDE PRN MG: 1 INJECTION, SOLUTION INTRAMUSCULAR; INTRAVENOUS; SUBCUTANEOUS at 08:31

## 2019-03-09 RX ADMIN — SODIUM CHLORIDE SCH: 0.9 INJECTION, SOLUTION INTRAVENOUS at 11:17

## 2019-03-09 NOTE — P.PN
Subjective


Date of Service: 03/09/19


Primary Care Provider: None


Chief Complaint: Abdominal pain


Subjective: Improving





Physical Examination





- Vital Signs


Temperature: 98.8 F


Blood Pressure: 165/75


Pulse: 74


Respirations: 16


Pulse Ox (%): 90





- Physical Exam


General: Alert, In no apparent distress, Oriented x3, Cooperative


HEENT: Atraumatic


Neck: Supple


Respiratory: Clear to auscultation bilaterally, Normal air movement


Cardiovascular: Normal pulses, Regular rate/rhythm


Gastrointestinal: Normal bowel sounds, Soft and benign, Non-distended, No masses

, No rebound, No guarding, Tenderness (Less pain to the epigastric region)


Musculoskeletal: No erythema, No tenderness, No warmth


Integumentary: No tenderness/swelling, No erythema, No warmth, No cyanosis


Neurological: Normal speech, Normal strength at 5/5 x4 extr, Normal tone, 

Normal affect





- Studies


Medications List Reviewed: Yes





Assessment & Plan


Discharge Plan: Home


Plan to discharge in: 24 Hours


Physician Review Additional Text: 





Impression:


Epigastric abdominal pain, nausea and vomiting secondary to acute, recurrent 

alcoholic pancreatitis


COPD


Hypertension


GERD


Hiatal hernia


Fatty liver


Tobacco/alcohol abuse





Plan:


Epigastric abdominal pain, nausea and vomiting secondary to acute, recurrent 

alcoholic pancreatitis:  Patient continues to improve.  Will increase diet to 

full liquid.  Lipase within normal range.  Encourage ambulation.  If tolerating 

diet will advance to a GI soft.  Possible discharge tomorrow if significantly 

improved.  Continue to address alcohol cessation.  


COPD:  Will continue with COPD medication.  Anticipate the need for COPD 

medication at discharge.


Hypertension:  Will transition to oral medication


GERD:  Will adjust to oral medication


Hiatal hernia:  Will adjust to oral medication


Fatty liver:  Will address education at discharge.


Tobacco/alcohol abuse:  Tobacco/alcohol cessation readdressed in detail.  Will 

need to monitor for alcohol withdrawal.  No alcohol withdrawal at this time.  

Patient drinks 6 beers per day.  No history of prior alcohol withdrawal.  

Patient doing well.  Patient understands risk of continued alcohol use.  Will 

adjust to oral medication


Time Spent Managing Pts Care (In Minutes): 55

## 2019-03-10 LAB
ALBUMIN SERPL BCP-MCNC: 3.1 G/DL (ref 3.4–5)
ALP SERPL-CCNC: 78 U/L (ref 45–117)
ALT SERPL W P-5'-P-CCNC: 17 U/L (ref 12–78)
AST SERPL W P-5'-P-CCNC: 14 U/L (ref 15–37)
BUN BLD-MCNC: 3 MG/DL (ref 7–18)
GLUCOSE SERPLBLD-MCNC: 90 MG/DL (ref 74–106)
HCT VFR BLD CALC: 30.8 % (ref 39.6–49)
LYMPHOCYTES # SPEC AUTO: 1.4 K/UL (ref 0.7–4.9)
MAGNESIUM SERPL-MCNC: 1.7 MG/DL (ref 1.8–2.4)
PMV BLD: 8.3 FL (ref 7.6–11.3)
POTASSIUM SERPL-SCNC: 3.6 MMOL/L (ref 3.5–5.1)
RBC # BLD: 3.61 M/UL (ref 4.33–5.43)

## 2019-03-10 RX ADMIN — LISINOPRIL SCH: 5 TABLET ORAL at 08:31

## 2019-03-10 RX ADMIN — ENOXAPARIN SODIUM SCH MG: 40 INJECTION SUBCUTANEOUS at 08:39

## 2019-03-10 RX ADMIN — HYDROMORPHONE HYDROCHLORIDE PRN MG: 1 INJECTION, SOLUTION INTRAMUSCULAR; INTRAVENOUS; SUBCUTANEOUS at 13:27

## 2019-03-10 RX ADMIN — HYDROMORPHONE HYDROCHLORIDE PRN MG: 1 INJECTION, SOLUTION INTRAMUSCULAR; INTRAVENOUS; SUBCUTANEOUS at 21:59

## 2019-03-10 RX ADMIN — SODIUM CHLORIDE SCH MLS: 0.9 INJECTION, SOLUTION INTRAVENOUS at 03:19

## 2019-03-10 RX ADMIN — NICOTINE SCH MG: 21 PATCH TRANSDERMAL at 08:36

## 2019-03-10 RX ADMIN — HYDROMORPHONE HYDROCHLORIDE PRN MG: 1 INJECTION, SOLUTION INTRAMUSCULAR; INTRAVENOUS; SUBCUTANEOUS at 03:24

## 2019-03-10 RX ADMIN — Medication SCH ML: at 08:53

## 2019-03-10 RX ADMIN — Medication SCH ML: at 20:07

## 2019-03-10 RX ADMIN — MOMETASONE FUROATE AND FORMOTEROL FUMARATE DIHYDRATE SCH PUFF: 100; 5 AEROSOL RESPIRATORY (INHALATION) at 08:34

## 2019-03-10 RX ADMIN — FOLIC ACID SCH MG: 1 TABLET ORAL at 08:30

## 2019-03-10 RX ADMIN — PANTOPRAZOLE SODIUM SCH MG: 40 TABLET, DELAYED RELEASE ORAL at 05:56

## 2019-03-10 RX ADMIN — HYDROMORPHONE HYDROCHLORIDE PRN MG: 1 INJECTION, SOLUTION INTRAMUSCULAR; INTRAVENOUS; SUBCUTANEOUS at 17:58

## 2019-03-10 RX ADMIN — Medication SCH MG: at 08:30

## 2019-03-10 RX ADMIN — HYDROMORPHONE HYDROCHLORIDE PRN MG: 1 INJECTION, SOLUTION INTRAMUSCULAR; INTRAVENOUS; SUBCUTANEOUS at 08:52

## 2019-03-10 RX ADMIN — MOMETASONE FUROATE AND FORMOTEROL FUMARATE DIHYDRATE SCH PUFF: 100; 5 AEROSOL RESPIRATORY (INHALATION) at 20:06

## 2019-03-10 NOTE — P.PN
Subjective


Date of Service: 03/10/19


Primary Care Provider: None


Chief Complaint: Abdominal pain


Subjective: Improving (Patient doing well.  Patient ambulating.  Pain 

significantly improved.  Patient has tolerated his current diet.)





Physical Examination





- Vital Signs


Temperature: 99.0 F


Blood Pressure: 130/60


Pulse: 100


Respirations: 18


Pulse Ox (%): 92





- Physical Exam


General: Alert, In no apparent distress, Oriented x3, Cooperative


HEENT: Atraumatic


Neck: Supple


Respiratory: Clear to auscultation bilaterally, Normal air movement


Cardiovascular: Normal pulses, Regular rate/rhythm


Gastrointestinal: Normal bowel sounds, Soft and benign, Non-distended, No 

tenderness, No masses, No rebound, No guarding


Musculoskeletal: No erythema, No tenderness, No warmth


Integumentary: No tenderness/swelling, No erythema, No warmth, No cyanosis


Neurological: Normal speech, Normal strength at 5/5 x4 extr, Normal tone, 

Normal affect





- Studies


Medications List Reviewed: Yes





Assessment & Plan


Discharge Plan: Home


Plan to discharge in: 24 Hours


Physician Review Additional Text: 





Impression:


Epigastric abdominal pain, nausea and vomiting secondary to acute, recurrent 

alcoholic pancreatitis


COPD


Hypertension


GERD


Hiatal hernia


Fatty liver


Tobacco/alcohol abuse





Plan:


Epigastric abdominal pain, nausea and vomiting secondary to acute, recurrent 

alcoholic pancreatitis:  Patient has significantly improved.  Minimal pain 

noted. Patient able tolerate current diet.  Will try a GI soft diet this 

morning.  If the patient tolerates this, then will consider discharge today.  

Educated on alcohol cessation.  Patient plans to quit.  Patient will need to 

follow up with GI as an outpatient to further monitor and address Will also 

discontinue IV fluids at this time.  Will check room air saturations.  Patient 

with COPD.  Patient will require medication at discharge. Patient may require 

home oxygen at discharge. This may delay his discharge today if doing well.  

Will reassess.  If the patient requires 1 more day prior to discharge, I will 

turn the service over to Dr. Hudson tomorrow.  I will go over the plan of care 

with her. 


COPD:  Will continue with COPD medication.  Patient will require Dulera and 

ProAir at discharge. Will check to see if the patient requires home oxygen.  

This may delay discharge until tomorrow.  Will ambulate and check room-air 

saturations with rest and exertion.  


Hypertension:  Blood pressure well controlled with medication-lisinopril 5 mg 

daily


GERD:  Continue with medication-Protonix 40 mg daily


Hiatal hernia:  Continue with medication


Fatty liver:  Will provide education.  Patient should follow up with GI as an 

outpatient to further address.


Tobacco/alcohol abuse:  Tobacco/alcohol cessation readdressed in detail.  

Patient plans to quit alcohol entirely.  He understands the risks of continued 

alcohol use.  Tobacco cessation maybe a little bit more difficult for him.


Time Spent Managing Pts Care (In Minutes): 55

## 2019-03-10 NOTE — RAD REPORT
EXAM DESCRIPTION:  RAD - Chest Pa And Lat (2 Views) - 3/10/2019 9:08 am

 

CLINICAL HISTORY:  follow up COPD

Chest pain.

 

COMPARISON:  Chest Single View dated 3/6/2019

 

FINDINGS:  Linear opacities in both lung bases, probably represent atelectasis. A small left pleural 
effusion has developed since the comparative study. A small infiltrate in the left lung base is diffi
cult to completely rule out. The heart is normal in size. No displaced fractures.

## 2019-03-10 NOTE — P.DS
Admission Date: 03/06/19


Discharge Date: 03/10/19


Primary Care Provider: None


Disposition: ROUTINE DISCHARGE


Discharge Condition: GOOD


Reason for Admission: Abdominal pain


Consultations: 





GI-Dr. Rush


Procedures: 





CT scan: 


COMPARISON:  No comparisons 


TECHNIQUE:  Biphasic CT imaging of the abdomen and pelvis was performed with 

100 ml non-ionic IV contrast. 


All CT scans are performed using dose optimization technique as appropriate and 

may include automated exposure control or mA/KV adjustment according to patient 

size. 


FINDINGS:  Emphysematous lung bases are present.Small hiatal hernia is noted. 


Mild fatty liver is present. The spleen, adrenal glands and kidneys are within 

normal limits. Moderate edema and fluid is seen surrounding the pancreas 

compatible with acute pancreatitis. Calcifications are seen in the region the 

pancreatic head which could be within the pancreatic duct. Few small low-

density collections in the pancreatic neck would represent dilated side 

branches. No evidence of pancreatic necrosis or portal vein thrombus. 


No bowel obstruction, free air, free fluid or abscess. Aortic atherosclerosis. 

The appendix is normal.  No evidence of significant lymphadenopathy. 


No suspicious bony findings. 


IMPRESSION:  Moderate acute pancreatitis.





Impression:


Epigastric abdominal pain, nausea and vomiting secondary to acute, recurrent 

alcoholic pancreatitis


COPD


Hypertension


GERD


Hiatal hernia


Fatty liver


Tobacco/alcohol abuse





Brief History of Present Illness: 





63-year-old  male presented to the emergency room with abdominal pain, 

nausea and vomiting.  Abdominal pain mainly to the epigastric region.  

Radiating to the back.  Pain was severe.  Patient with history of COPD, 

alcoholic pancreatitis, hypertension, GERD, tobacco and alcohol abuse.  Patient 

came to the ER for further evaluation.  Patient denied any fever, chills, chest 

pain or shortness of breath.





In the ER patient evaluated.  White count 14.9, hemoglobin 12.6.  Lipase level 

was elevated at 7484. Sodium 141, potassium 3.5, BUN of 4, creatinine 0.7 with 

a GFR of 90.  Glucose 116.  AST and ALT within normal range.  Total bilirubin 

within normal range.  Troponin less than 0.0 2.  CT revealed moderate 

pancreatitis, small hiatal hernia and fatty liver.  Chest x-ray showed COPD 

changes.  Patient was admitted for treatment.





In the ER when I evaluated the patient, pain seemed to be better controlled.  

No significant nausea vomiting noted. Patient admits drinking alcohol and 

primarily beer about 6 beers per day.  He he reports no prior history of 

alcohol withdrawal.  He still smokes as well.  Patient reports prior history of 

pancreatitis in the past.  He has taken blood pressure medication in the past 

but no longer takes this.  He does not take any inhalers for his COPD.


Hospital Course: 





Patient presented with epigastric pain, nausea and vomiting secondary to acute, 

recurrent alcoholic pancreatitis.  Patient received IV fluids during the course 

of his stay.  Pancreatic levels improved.  Diet advanced.  At discharge patient 

tolerating current diet.  Pain to epigastric region significantly improved.  At 

discharge patient will continue with Protonix 40 mg daily, folic acid 1 mg daily

, and thiamine 100 mg once daily.  Patient to continue with alcohol cessation.  

Patient understands the risk of continued alcohol.  Recommend to follow up with 

GI to further monitor and address.





Patient likely with underlying COPD.  Patient given treatment in the hospital.  

This has improved.  At discharge patient will continue with Dulera 100mcg  2 

puffs twice daily and Pro air 2 puffs 3 times a day as needed for shortness of 

breath.  Recommend to follow up with pulmonology to establish care.  Patient 

will require pulmonary function test to further evaluate.  Prior to discharge 

patient evaluated for home oxygen.  Patient requires home oxygen at discharge. 

Maintain sats above 90%.  Patient with chronic COPD currently stable this time.





Patient found to have hypertension.  Lisinopril added.  At discharge he will 

continue with lisinopril 5 mg daily.  Recommend to maintain blood pressures 

less 150/80.  





Patient with GERD with hiatal hernia.  At discharge he will continue with 

Protonix 40 mg daily.  Recommend to follow up with GI as an outpatient.  

Patient will likely require a EGD evaluation.





Patient with fatty liver.  Education will be provided.  Recommend to follow up 

with GI as an outpatient to further address.





Patient with tobacco and alcohol abuse.  Tobacco and alcohol cessation 

education will be provided.  Patient understands the risks of both.  Patient 

plans to quit. 


Vital Signs/Physical Exam: 














Temp Pulse Resp BP Pulse Ox


 


 99.0 F   100 H  18   130/60   92 


 


 03/10/19 08:41  03/10/19 08:41  03/10/19 08:41  03/10/19 08:41  03/10/19 08:41








General: Alert, In no apparent distress, Oriented x3, Cooperative


HEENT: Atraumatic


Neck: Supple


Respiratory: Clear to auscultation bilaterally, Normal air movement


Cardiovascular: Normal pulses, Regular rate/rhythm


Gastrointestinal: Normal bowel sounds, Soft and benign, Non-distended, No 

tenderness, No masses, No rebound, No guarding


Musculoskeletal: No erythema, No tenderness, No warmth


Integumentary: No tenderness/swelling, No erythema, No warmth, No cyanosis


Neurological: Normal speech, Normal strength at 5/5 x4 extr, Normal tone, 

Normal affect


Laboratory Data at Discharge: 














WBC  7.0 K/uL (4.3-10.9)   03/10/19  05:29    


 


Hgb  10.1 g/dL (13.6-17.9)  L  03/10/19  05:29    


 


Hct  30.8 % (39.6-49.0)  L  03/10/19  05:29    


 


Plt Count  278 K/uL (152-406)   03/10/19  05:29    


 


PT  11.4 SECONDS (9.5-12.5)   03/06/19  06:45    


 


INR  0.96   03/06/19  06:45    


 


Sodium  141 mmol/L (136-145)   03/10/19  05:29    


 


Potassium  3.6 mmol/L (3.5-5.1)   03/10/19  05:29    


 


BUN  3 mg/dL (7-18)  L  03/10/19  05:29    


 


Creatinine  0.56 mg/dL (0.55-1.3)   03/10/19  05:29    


 


Glucose  90 mg/dL ()   03/10/19  05:29    


 


Magnesium  1.7 mg/dL (1.8-2.4)  L  03/10/19  05:29    


 


Total Bilirubin  0.3 mg/dL (0.2-1.0)   03/10/19  05:29    


 


AST  14 U/L (15-37)  L  03/10/19  05:29    


 


ALT  17 U/L (12-78)   03/10/19  05:29    


 


Alkaline Phosphatase  78 U/L ()   03/10/19  05:29    


 


Triglycerides  157 mg/dL (<150)  H  03/07/19  03:45    


 


Cholesterol  254 mg/dL (<200)  H  03/07/19  03:45    


 


HDL Cholesterol  52 mg/dL (40-60)   03/07/19  03:45    


 


Cholesterol/HDL Ratio  4.88   03/07/19  03:45    


 


Lipase  260 U/L ()   03/09/19  11:55    








Home Medications: 








Aspirin Chewable [Aspirin Chewable*] 1 tab PO DAILY 03/06/19 


Cetirizine HCl [Zyrtec*] 1 tab PO DAILY 03/06/19 


Cholecalciferol (Vitamin D3) [Vitamin D3] 1 tab PO DAILY 03/06/19 


Albuterol Sulfate [Proair Hfa] 2 puff IH TID PRN #1 hfa.aer.ad 03/10/19 


Lisinopril [Prinivil*] 5 mg PO DAILY #90 tab 03/10/19 


Mometasone/Formoterol [Dulera 100 Mcg/5 Mcg Inhaler] 2 puff IH BID #1 inhaler 03

/10/19 


Pantoprazole [Protonix Tab*] 40 mg PO DAILYAC #30 tab 03/10/19 


Thiamine HCl [Vitamin B-1*] 100 mg PO DAILY #90 tablet 03/10/19 





New Medications: 


Albuterol Sulfate [Proair Hfa] 2 puff IH TID PRN #1 hfa.aer.ad


 PRN Reason: Shortness Of Breath


Lisinopril [Prinivil*] 5 mg PO DAILY #90 tab


Mometasone/Formoterol [Dulera 100 Mcg/5 Mcg Inhaler] 2 puff IH BID #1 inhaler


Pantoprazole [Protonix Tab*] 40 mg PO DAILYAC #30 tab


Thiamine HCl [Vitamin B-1*] 100 mg PO DAILY #90 tablet


Patient Discharge Instructions: 1.  Patient will need a follow up with his PCP 

in 1 week to follow up this hospitalization.  2.  Patient presented with 

epigastric pain, nausea and vomiting secondary to acute, recurrent alcoholic 

pancreatitis.  Patient received IV fluids during the course of his stay.  

Pancreatic levels improved.  Diet advanced.  At discharge patient tolerating 

current diet.  Pain to epigastric region significantly improved.  At discharge 

patient will continue with Protonix 40 mg daily, folic acid 1 mg daily, and 

thiamine 100 mg once daily.  Patient to continue with alcohol cessation.  

Patient understands the risk of continued alcohol.  Recommend to follow up with 

GI to further monitor and address.  3.  Patient likely with underlying COPD.  

Patient given treatment in the hospital.  This has improved.  At discharge 

patient will continue with Dulera 100mcg  2 puffs twice daily and Pro air 2 

puffs 3 times a day as needed for shortness of breath.  Recommend to follow up 

with pulmonology to establish care.  Patient will require pulmonary function 

test to further evaluate.  Prior discharge patient requires home oxygen.  Home 

oxygen will be arranged prior to discharge. He is to maintain sats above 90%.  

This can be further addressed and monitored by pulmonology.  4.  Patient found 

to have hypertension.  Lisinopril added.  At discharge he will continue with 

lisinopril 5 mg daily.  Recommend to maintain blood pressures less 150/80.  5.  

Patient with GERD with hiatal hernia.  At discharge he will continue with 

Protonix 40 mg daily.  Recommend to follow up with GI as an outpatient.  

Patient will likely require a EGD evaluation.  6.  Patient with fatty liver.  

Education will be provided.  Recommend to follow up with GI as an outpatient to 

further address.  7.  Patient with tobacco and alcohol abuse.  Tobacco and 

alcohol cessation education will be provided.  Patient understands the risks of 

both.  Patient plans to quit.


Diet: GI soft diet


Activity: Ad mike


Time spent managing pt's care (in minutes): 55

## 2019-03-11 LAB
ALBUMIN SERPL BCP-MCNC: 3.2 G/DL (ref 3.4–5)
ALP SERPL-CCNC: 82 U/L (ref 45–117)
ALT SERPL W P-5'-P-CCNC: 17 U/L (ref 12–78)
AST SERPL W P-5'-P-CCNC: 20 U/L (ref 15–37)
BUN BLD-MCNC: 3 MG/DL (ref 7–18)
GLUCOSE SERPLBLD-MCNC: 91 MG/DL (ref 74–106)
HCT VFR BLD CALC: 32 % (ref 39.6–49)
LYMPHOCYTES # SPEC AUTO: 1.6 K/UL (ref 0.7–4.9)
MAGNESIUM SERPL-MCNC: 1.7 MG/DL (ref 1.8–2.4)
PMV BLD: 8.6 FL (ref 7.6–11.3)
POTASSIUM SERPL-SCNC: 3.6 MMOL/L (ref 3.5–5.1)
RBC # BLD: 3.76 M/UL (ref 4.33–5.43)

## 2019-03-11 RX ADMIN — LISINOPRIL SCH MG: 5 TABLET ORAL at 08:07

## 2019-03-11 RX ADMIN — NICOTINE SCH MG: 21 PATCH TRANSDERMAL at 08:06

## 2019-03-11 RX ADMIN — PANTOPRAZOLE SODIUM SCH MG: 40 TABLET, DELAYED RELEASE ORAL at 05:39

## 2019-03-11 RX ADMIN — MOMETASONE FUROATE AND FORMOTEROL FUMARATE DIHYDRATE SCH PUFF: 100; 5 AEROSOL RESPIRATORY (INHALATION) at 08:05

## 2019-03-11 RX ADMIN — HYDROMORPHONE HYDROCHLORIDE PRN MG: 1 INJECTION, SOLUTION INTRAMUSCULAR; INTRAVENOUS; SUBCUTANEOUS at 12:03

## 2019-03-11 RX ADMIN — ENOXAPARIN SODIUM SCH MG: 40 INJECTION SUBCUTANEOUS at 08:05

## 2019-03-11 RX ADMIN — HYDROMORPHONE HYDROCHLORIDE PRN MG: 1 INJECTION, SOLUTION INTRAMUSCULAR; INTRAVENOUS; SUBCUTANEOUS at 03:55

## 2019-03-11 RX ADMIN — HYDROMORPHONE HYDROCHLORIDE PRN MG: 1 INJECTION, SOLUTION INTRAMUSCULAR; INTRAVENOUS; SUBCUTANEOUS at 08:08

## 2019-03-11 RX ADMIN — Medication SCH ML: at 08:08

## 2019-03-11 RX ADMIN — FOLIC ACID SCH MG: 1 TABLET ORAL at 08:07

## 2019-03-11 RX ADMIN — Medication SCH MG: at 08:06

## 2019-03-11 NOTE — PN
Date of Progress Note:  03/11/2019



Subjective:  The patient is seen and examined.  Chart reviewed and case discussed with RN.  The patie
nt was discharged yesterday.  However, did have some hypoxia and discharge was held until the patient
 was with either to be set up with home oxygen or was stable enough without it.



Medications:  List reviewed.



Physical Examination:

Vital Signs:  Temperature 99, heart rate 91, blood pressure 116/69, respirations 18, O2 92% on room a
ir. 

General:  Awake, alert, and oriented x3.  Not in any acute distress. 

CV:  S1, S2.  No murmurs.  Peripheral pulses present. 

Respiratory:  Moving air well bilaterally.  No wheezing.  Some diminished breath sounds on the right 
base. 

Extremities:  No clubbing, cyanosis, or edema. 

Neurologic:  Nonfocal.



Laboratory Data:  Sodium 140, potassium 3.6, chloride 101, CO2 30, BUN 3, creatinine 0.53, glucose 91
, calcium 8.5, magnesium 1.9.  WBC 7.9, H and H 7.6 and 32, platelets 287.  Blood cultures, no growth
 to date.



Assessment And Plan:  

1.Epigastric abdominal pain, nausea, vomiting, improved, resolved.

2.Recurrent alcoholic pancreatitis.  Lipase levels are normalized.  The patient has been counseled.

3.Chronic obstructive pulmonary disease.  The patient doing well off oxygen on room air.  Will be se
t up with Dulera and albuterol p.r.n.  Needs to follow up with pulmonologist as outpatient for PFTs.

4.Essential hypertension, stable.

5.Gastroesophageal reflux disease, on PPI.

6.Hiatal hernia.

7.Fatty liver disease.  The patient has been counseled.  We will need to seek GI evaluation and repe
at imaging.  The patient understands that fatty liver disease can progress to nonalcoholic fatty live
r cirrhosis and require need for transplant or may result in death from chronic liver failure.

8.Nicotine dependence with cigarette smoking, counseled.  The patient plans to stop smoking complete
ly.

9.Alcohol abuse, counseled.  The patient understands risk of alcohol and recurrent pancreatitis.



Plan:  Discharge home.





/FIDELINA

DD:  03/11/2019 14:08:01Voice ID:  [id number]

DT:  03/11/2019 18:41:00Report ID:  [id number]

## 2020-06-01 ENCOUNTER — HOSPITAL ENCOUNTER (INPATIENT)
Dept: HOSPITAL 97 - ER | Age: 65
LOS: 4 days | Discharge: HOME | DRG: 439 | End: 2020-06-05
Attending: HOSPITALIST | Admitting: HOSPITALIST
Payer: COMMERCIAL

## 2020-06-01 VITALS — BODY MASS INDEX: 19.1 KG/M2

## 2020-06-01 DIAGNOSIS — K85.20: Primary | ICD-10-CM

## 2020-06-01 DIAGNOSIS — Z79.52: ICD-10-CM

## 2020-06-01 DIAGNOSIS — J44.9: ICD-10-CM

## 2020-06-01 DIAGNOSIS — Z79.82: ICD-10-CM

## 2020-06-01 DIAGNOSIS — K86.3: ICD-10-CM

## 2020-06-01 DIAGNOSIS — Z86.73: ICD-10-CM

## 2020-06-01 DIAGNOSIS — F17.210: ICD-10-CM

## 2020-06-01 DIAGNOSIS — Z79.899: ICD-10-CM

## 2020-06-01 DIAGNOSIS — K21.9: ICD-10-CM

## 2020-06-01 DIAGNOSIS — I10: ICD-10-CM

## 2020-06-01 LAB
ALBUMIN SERPL BCP-MCNC: 3.2 G/DL (ref 3.4–5)
ALP SERPL-CCNC: 109 U/L (ref 45–117)
ALT SERPL W P-5'-P-CCNC: 27 U/L (ref 12–78)
AST SERPL W P-5'-P-CCNC: 17 U/L (ref 15–37)
BUN BLD-MCNC: 12 MG/DL (ref 7–18)
GLUCOSE SERPLBLD-MCNC: 130 MG/DL (ref 74–106)
HCT VFR BLD CALC: 40.2 % (ref 39.6–49)
LIPASE SERPL-CCNC: 5712 U/L (ref 73–393)
LYMPHOCYTES # SPEC AUTO: 0.6 K/UL (ref 0.7–4.9)
PMV BLD: 8.8 FL (ref 7.6–11.3)
POTASSIUM SERPL-SCNC: 3.8 MMOL/L (ref 3.5–5.1)
RBC # BLD: 4.22 M/UL (ref 4.33–5.43)

## 2020-06-01 PROCEDURE — 36415 COLL VENOUS BLD VENIPUNCTURE: CPT

## 2020-06-01 PROCEDURE — 96374 THER/PROPH/DIAG INJ IV PUSH: CPT

## 2020-06-01 PROCEDURE — 96375 TX/PRO/DX INJ NEW DRUG ADDON: CPT

## 2020-06-01 PROCEDURE — 85730 THROMBOPLASTIN TIME PARTIAL: CPT

## 2020-06-01 PROCEDURE — 83690 ASSAY OF LIPASE: CPT

## 2020-06-01 PROCEDURE — 99285 EMERGENCY DEPT VISIT HI MDM: CPT

## 2020-06-01 PROCEDURE — 85025 COMPLETE CBC W/AUTO DIFF WBC: CPT

## 2020-06-01 PROCEDURE — 96361 HYDRATE IV INFUSION ADD-ON: CPT

## 2020-06-01 PROCEDURE — 80076 HEPATIC FUNCTION PANEL: CPT

## 2020-06-01 PROCEDURE — 93005 ELECTROCARDIOGRAM TRACING: CPT

## 2020-06-01 PROCEDURE — 85610 PROTHROMBIN TIME: CPT

## 2020-06-01 PROCEDURE — 80061 LIPID PANEL: CPT

## 2020-06-01 PROCEDURE — 80048 BASIC METABOLIC PNL TOTAL CA: CPT

## 2020-06-01 PROCEDURE — 74177 CT ABD & PELVIS W/CONTRAST: CPT

## 2020-06-01 PROCEDURE — 80053 COMPREHEN METABOLIC PANEL: CPT

## 2020-06-01 NOTE — XMS REPORT
Continuity of Care Document

                             Created on:2020



Patient:MELVI FIGUEROA

Sex:Male

:1955

External Reference #:[id number]





Demographics







                          Address                   [address]



                                                    [place] [address]

 

                          Home Phone                [phone]

 

                          Preferred Language        Unknown

 

                          Marital Status            Unknown

 

                          Bahai Affiliation     Unknown

 

                          Race                      Unknown

 

                          Additional Race(s)        Unavailable

 

                          Ethnic Group              Unknown









Author







                          Organization              [facility]

t

 

                          Address                   [address] [place] [address] [address]. Watts [address] [place] [id number]

 

                          Phone                     [phone]









Care Team Providers







                    Name                Role                Phone

 

                    Unavailable         Unavailable         Unavailable









Problems

This patient has no known problems.



Allergies, Adverse Reactions, Alerts

This patient has no known allergies or adverse reactions.



Medications

This patient has no known medications.



Procedures

This patient has no known procedures.



Results

This patient has no known results.

## 2020-06-01 NOTE — EDPHYS
Physician Documentation                                                                           

 [facility]                                                                 

Name: Dandy Garnica                                                                                  

Age: 64 yrs                                                                                       

Sex: Male                                                                                         

: 1955                                                                                   

MRN: [id number]                                                                                   

Arrival Date: 2020                                                                          

Time: 16:58                                                                                       

Account#: [id number]                                                                            

Bed 6                                                                                             

Private MD:                                                                                       

ANTOINE Physician Dino Medley                                                                      

HPI:                                                                                              

                                                                                             

17:25 This 64 yrs old  Male presents to ER via Ambulatory with complaints of         cp  

      Abdominal Pain, Back Pain, Vomiting.                                                        

17:25 The patient presents with abdominal pain that is diffuse. Onset: The symptoms/episode   cp  

      began/occurred gradually.                                                                   

17:25 Associated signs and symptoms: Pertinent positives: nausea and vomiting, chest pain,    cp  

      Pertinent negatives: blood in stools, constipation, diarrhea, dysuria, fever,               

      palpitations, shortness of breath, testicular pain.                                         

                                                                                                  

Historical:                                                                                       

- Allergies:                                                                                      

17:06 No Known Allergies;                                                                     ll1 

- PMHx:                                                                                           

17:06 Back pain; COPD; Hypertension; Pancreatitis;                                            ll1 

                                                                                                  

- Immunization history:: Adult Immunizations up to date.                                          

- Social history:: Smoking status: Patient reports the use of cigarette tobacco                   

  products, smokes one-half pack cigarettes per day, Patient uses alcohol, on a daily             

  basis. claims drinking about a 6 pack/day. Patient/guardian denies using street drugs.          

                                                                                                  

                                                                                                  

ROS:                                                                                              

17:30 Constitutional: Negative for body aches, chills, fever.                                 cp  

17:30 Eyes: Negative for injury, pain, redness, and discharge.                                cp  

17:30 ENT: Negative for ear pain, sore throat, difficulty swallowing, difficulty handling         

      secretions.                                                                                 

17:30 Cardiovascular: Positive for chest pain, Negative for edema.                                

17:30 Respiratory: Negative for cough, shortness of breath, wheezing.                             

17:30 Abdomen/GI: Positive for abdominal pain, nausea and vomiting, constipation, anorexia,       

      Negative for diarrhea, hematemesis, black/tarry stool, rectal bleeding.                     

17:30 Back: Positive for pain at rest, pain with movement.                                        

17:30 : Negative for urinary symptoms, testicular pain                                          

17:30 Skin: Negative for rash.                                                                    

17:30 Neuro: Negative for altered mental status, dizziness, headache, weakness.                   

17:30 All other systems are negative.                                                             

                                                                                                  

Exam:                                                                                             

17:35 Constitutional: The patient appears in no acute distress, alert, awake,                 cp  

      non-diaphoretic, non-toxic, well developed, well nourished.                                 

17:35 Head/Face:  Normocephalic, atraumatic.                                                  cp  

17:35 Eyes: Periorbital structures: appear normal, Conjunctiva: normal, no exudate, no            

      injection, Sclera: no appreciated abnormality, Lids and lashes: appear normal,              

      bilaterally.                                                                                

17:35 ENT: External ear(s): are unremarkable, Nose: is normal, Mouth: Lips: dry, Oral mucosa:     

      moist, Posterior pharynx: Airway: no evidence of obstruction, patent.                       

17:35 Chest/axilla: Inspection: normal, Palpation: is normal, no crepitus, no tenderness.         

17:35 Cardiovascular: Rate: tachycardic, Rhythm: regular, Edema: is not appreciated, JVD: is      

      not appreciated.                                                                            

17:35 Respiratory: the patient does not display signs of respiratory distress,  Respirations:     

      normal, no use of accessory muscles, no retractions, labored breathing, is not present,     

      Breath sounds: are clear throughout, no decreased breath sounds, no stridor, no             

      wheezing.                                                                                   

17:35 Abdomen/GI: Inspection: abdomen appears normal, Bowel sounds: active, all quadrants,        

      Palpation: soft, in all quadrants, moderate abdominal tenderness, in the left upper         

      quadrant, rebound tenderness, is not appreciated, voluntary guarding, is elicited in        

      the left upper quadrant.                                                                    

17:35 Back: pain, that is moderate, ROM is painful.                                               

17:35 Skin: no rash present.                                                                      

17:35 Neuro: Orientation: to person, place \T\ time. Mentation: is normal, Motor: moves all       

      fours, strength is normal.                                                                  

19:55 ECG was reviewed by the Attending Physician.                                            cp  

                                                                                                  

Vital Signs:                                                                                      

17:05  / 93; Pulse 136; Resp 18; Temp 98.5; Pulse Ox 98% ; Pain 10/10;                  ll1 

18:00 Pulse 111;                                                                              ss  

19:15  / 102; Resp 17; Pain 4/10;                                                       jb4 

19:15  / 102; Pulse 110; Resp 17; Pulse Ox 97% on R/A; Pain 4/10;                       jb4 

19:50 Pulse 112;                                                                              jb4 

20:00  / 94; Pulse 110; Resp 13; Pulse Ox 96% on R/A;                                   jb4 

20:45  / 95; Pulse 112; Resp 16; Pulse Ox 93% on R/A; Pain 8/10;                        jb4 

21:30  / 79; Pulse 104; Resp 15; Pulse Ox 94% on R/A;                                   jb4 

22:15  / 85; Pulse 113; Resp 17; Pulse Ox 93% on R/A;                                   jb4 

23:00  / 87; Pulse 104; Resp 15; Pulse Ox 95% on R/A; Pain 0/10;                        jb4 

23:45  / 93; Pulse 105; Resp 16; Pulse Ox 95% on R/A;                                   rv  

                                                                                                  

MDM:                                                                                              

17:11 Patient medically screened.                                                             [facility] 

22:07 Data reviewed: vital signs, nurses notes, lab test result(s), EKG, radiologic studies,  cp  

      CT scan, and as a result, I will admit patient. Physician consultation: Chu Pickard MD was called at 22:08, was contacted at 22:08, regarding admission, to the telemetry       

      unit. patient's condition.                                                                  

                                                                                                  

                                                                                             

17:20 Order name: Basic Metabolic Panel; Complete Time: 18:38                                   

                                                                                             

18:38 Interpretation: Normal except: ; CL 89; GLUC 130.                                   

                                                                                             

17:20 Order name: CBC with Diff; Complete Time: 18:38                                           

                                                                                             

22:05 Interpretation: Normal except: WBC 12.2; RBC 4.22; MCV 95.2; MCH 32.4; RDW 15.9; TRISTAN%   cp  

      80.6; LYM% 5.2; MN% 13.9; NEUT A 9.9; LYMA 0.6; MNA 1.7.                                    

                                                                                             

17:20 Order name: Hepatic Function; Complete Time: 18:38                                      cp  

                                                                                             

17:20 Order name: Lipase; Complete Time: 18:38                                                cp  

                                                                                             

18:39 Interpretation: Abnormal: LIP 5712.                                                     cp  

                                                                                             

22:51 Order name: CBC with Automated Diff                                                     EDMS

                                                                                             

22:51 Order name: CBC with Automated Diff                                                     EDMS

                                                                                             

22:51 Order name: Comprehensive Metabolic Panel                                               EDMS

                                                                                             

22:51 Order name: Comprehensive Metabolic Panel                                               [facility]

                                                                                             

22:51 Order name: Lipase                                                                      EDMS

                                                                                             

22:51 Order name: Lipase                                                                      EDMS

                                                                                             

22:51 Order name: Lipid Profile                                                               EDMS

                                                                                             

22:51 Order name: Lipid Profile                                                               [facility]

                                                                                             

22:51 Order name: Protime (+INR)                                                              EDMS

                                                                                             

22:51 Order name: Protime (+INR)                                                              [facility]

                                                                                             

17:20 Order name: IV Saline Lock; Complete Time: 19:02                                        cp  

                                                                                             

17:20 Order name: Labs collected and sent; Complete Time: 19:02                               cp  

                                                                                             

17:20 Order name: EKG; Complete Time: 17:21                                                   cp  

                                                                                             

17:59 Order name: CT Abd/Pelvis - IV Contrast Only                                              

                                                                                             

22:51 Order name: CONS Pharmacy Consult                                                       [facility]

                                                                                             

22:51 Order name: NPO                                                                         [facility]

                                                                                             

22:51 Order name: PTT, Activated Partial Thromb                                               EDMS

                                                                                             

22:51 Order name: PTT, Activated Partial Thromb                                               [facility]

                                                                                             

17:20 Order name: EKG - Nurse/Tech; Complete Time: 19:50                                      cp  

                                                                                                  

EC:55 Rate is 109 beats/min. Rhythm is regular. CA interval is normal. QRS interval is        cp  

      normal. QT interval is normal. Interpreted by me. Reviewed by me.                           

                                                                                                  

Administered Medications:                                                                         

17:40 Drug: NS 0.9% 500 ml Route: IV; Rate: bolus; Site: right antecubital;                   ss  

17:41 Drug: ProTONIX 40 mg Route: IVP; Site: right antecubital;                               ss  

19:00 Follow up: Response: No adverse reaction                                                jb4 

17:51 Drug: morphine 4 mg Route: IVP; Site: right antecubital;                                ss  

19:15 Follow up:  / 102; Resp 17 bpm; Pain 4/10 Adult; Response: No adverse reaction;   jb4 

      Pain is decreased; RASS: Alert and Calm (0)                                                 

17:51 Drug: Zofran (Ondansetron) 4 mg Route: IVP; Site: right antecubital;                    ss  

19:00 Follow up: Response: No adverse reaction                                                jb4 

19:15 Drug: NS 0.9% 500 ml Route: IV; Rate: bolus; Site: right antecubital;                   jb4 

19:50 Follow up: Pulse 112 bpm; Response: No adverse reaction; IV Status: Completed infusion; jb4 

      IV Intake: 500ml                                                                            

19:45 Drug: NS 0.9% 1000 ml Route: IV; Rate: 100 ml/hr; Site: right antecubital;              jb4 

                                                                                             

00:30 Follow up: Response: No adverse reaction; IV Status: Completed infusion                 jb4 

                                                                                             

20:58 Drug: morphine 4 mg {Note: Rass score 0.} Route: IVP; Site: right antecubital;          [facility] 

21:30 Follow up: Response: No adverse reaction; Pain is decreased; RASS: Alert and Calm (0)   [facility] 

21:00 Drug: NS 0.9% 1000 ml Route: IV; Rate: 1 bolus; Site: right antecubital;                4 

22:00 Follow up: Response: No adverse reaction; IV Status: Completed infusion; IV Intake:     jb4 

      1000ml                                                                                      

                                                                                                  

                                                                                                  

Disposition:                                                                                      

                                                                                             

12:56 Co-signature as Attending Physician, Dino Medley MD I agree with the assessment and  netta 

      plan of care.                                                                               

                                                                                                  

Disposition:                                                                                      

20 22:09 Hospitalization ordered by Chu Pickard for Inpatient Admission. Preliminary     

  diagnosis is Alcohol induced acute pancreatitis.                                                

- Bed requested for Telemetry/MedSurg (Inpatient).                                                

- Status is Inpatient Admission.                                                              lp1 

- Condition is Stable.                                                                            

- Problem is new.                                                                                 

- Symptoms have improved.                                                                         

                                                                                                  

                                                                                                  

                                                                                                  

Signatures:                                                                                       

Dispatcher [employer]                           EDMS                                                 

Dino Medley MD MD cha Smirch, Shelby, RN                      RN   ss                                                   

Hillary Tatum RN                         RN   lp1                                                  

Dino More PA PA cp Garcia, Cindy, RN                       HERBER   cg                                                   

Rodrigo Ravi, RN                       RN   jb4                                                  

Tahir Lepe RN                        RN   jl7                                                  

Bran Alba, HERBER                       RN   ll1                                                  

                                                                                                  

Corrections: (The following items were deleted from the chart)                                    

                                                                                             

22:05 18:39 Normal except: WBC 12.2; RBC 4.22; MCV 95.2; MCH 32.4; RDW 15.9; TRISTAN% 80.6; LYM%  cp  

      5.2; MN% 13.9; NEUT A 9.9; LYMA 0.6. cp                                                     

23:22 22:09 Hospitalization Ordered by Chu Pickard MD for Inpatient Admission. Preliminary  cg  

      diagnosis is Alcohol induced acute pancreatitis. Bed requested for Telemetry/MedSurg        

      (Inpatient). Status is Inpatient Admission. Condition is Stable. Problem is new.            

      Symptoms have improved. cp                                                                  

23:41 23:22 2020 22:09 Hospitalization Ordered by Chu Pickard MD for Inpatient        cg  

      Admission. Preliminary diagnosis is Alcohol induced acute pancreatitis. Bed requested       

      for Telemetry/MedSurg (Inpatient). Status is Inpatient Admission. Condition is Stable.      

      Problem is new. Symptoms have improved.                                                   

23:47 23:41 2020 22:09 Hospitalization Ordered by Chu Pickard MD for Inpatient        cg  

      Admission. Preliminary diagnosis is Alcohol induced acute pancreatitis. Bed requested       

      for Telemetry/MedSurg (Inpatient). Status is Inpatient Admission. Condition is Stable.      

      Problem is new. Symptoms have improved.                                                   

                                                                                             

00:34  23:47 2020 22:09 Hospitalization Ordered by Chu Pickard MD for Inpatient  lp1 

      Admission. Preliminary diagnosis is Alcohol induced acute pancreatitis. Bed requested       

      for Telemetry/MedSurg (Inpatient). Status is Inpatient Admission. Condition is Stable.      

      Problem is new. Symptoms have improved.                                                   

                                                                                                  

**************************************************************************************************

## 2020-06-01 NOTE — ER
Nurse's Notes                                                                                     

 [facility]                                                                 

Name: Dandy Garnica                                                                                  

Age: 64 yrs                                                                                       

Sex: Male                                                                                         

: 1955                                                                                   

MRN: [id number]                                                                                   

Arrival Date: 2020                                                                          

Time: 16:58                                                                                       

Account#: [id number]                                                                            

Bed 6                                                                                             

Private MD:                                                                                       

Diagnosis: Alcohol induced acute pancreatitis                                                     

                                                                                                  

Presentation:                                                                                     

                                                                                             

17:05 Chief complaint: Patient states: Entire abdominal pain with N/V since Friday night.     ll1 

      States he has chronic back pain he is seeing a specialist for. Coronavirus screen:          

      Proceed with normal triage. Patient denies a cough. Patient denies shortness of breath      

      or difficulty breathing. Patient denies measured and/or subjective temperature greater      

      than 100.4F prior to today's visit. Patient denies travel on a cruise ship or to a          

      country the [facility] currently lists as an affected area. Patient denies contact with known      

      and/or suspected case of COVID-19. Ebola Screen: Patient denies travel to an                

      Ebola-affected area in the 21 days before illness onset. Initial Sepsis Screen: Does        

      the patient meet any 2 criteria? HR > 90 bpm. No. Patient's initial sepsis screen is        

      negative. Does the patient have a suspected source of infection? Yes: Acute abdominal       

      pain. Risk Assessment: Do you want to hurt yourself or someone else? Patient reports no     

      desire to harm self or others. Onset of symptoms was May 30, 2020.                          

17:05 Method Of Arrival: Ambulatory                                                           ll1 

17:05 Acuity: LISA 2                                                                           ll1 

                                                                                                  

Historical:                                                                                       

- Allergies:                                                                                      

17:06 No Known Allergies;                                                                     ll1 

- PMHx:                                                                                           

17:06 Back pain; COPD; Hypertension; Pancreatitis;                                            ll1 

                                                                                                  

- Immunization history:: Adult Immunizations up to date.                                          

- Social history:: Smoking status: Patient reports the use of cigarette tobacco                   

  products, smokes one-half pack cigarettes per day, Patient uses alcohol, on a daily             

  basis. claims drinking about a 6 pack/day. Patient/guardian denies using street drugs.          

                                                                                                  

                                                                                                  

Screenin:30 Abuse screen: Denies threats or abuse. Denies injuries from another. Nutritional        jl7 

      screening: No deficits noted. Tuberculosis screening: No symptoms or risk factors           

      identified. Fall Risk IV access (20 points). Total Stephenson Fall Scale indicates No Risk       

      (0-24 pts).                                                                                 

                                                                                                  

Assessment:                                                                                       

17:30 General: Appears in no apparent distress. uncomfortable, Behavior is calm, cooperative, jl7 

      appropriate for age. Pain: Complains of pain in abdomen. Neuro: Level of Consciousness      

      is awake, alert, obeys commands, Oriented to person, place, time, situation.                

      Cardiovascular: Patient's skin is warm and dry. Respiratory: Airway is patent               

      Respiratory effort is even, unlabored, Respiratory pattern is regular, symmetrical. GI:     

      Abdomen is non-distended, Reports upper abdominal pain. Derm: Skin is pink, warm \T\ dry.   

19:05 Reassessment: Patient appears in no apparent distress at this time. Patient and/or      jb4 

      family updated on plan of care and expected duration. Pain level reassessed. Patient is     

      alert, oriented x 3, equal unlabored respirations, skin warm/dry/pink. Pt reports pain      

      is tolerable and rates it 4/10 in his back. Patient states feeling better.                  

19:05 GI: Bowel sounds present X 4 quads. Abd is soft X 4 quads Abdomen is tender to          jb4 

      palpation X 4 quads.                                                                        

20:00 Reassessment: Patient appears in no apparent distress at this time. Patient and/or      jb4 

      family updated on plan of care and expected duration. Pain level reassessed. Patient is     

      alert, oriented x 3, equal unlabored respirations, skin warm/dry/pink.                      

21:00 Reassessment: Patient appears in no apparent distress at this time. Patient and/or      jb4 

      family updated on plan of care and expected duration. Pain level reassessed. Patient is     

      alert, oriented x 3, equal unlabored respirations, skin warm/dry/pink.                      

22:00 Reassessment: Patient appears in no apparent distress at this time. Patient and/or      jb4 

      family updated on plan of care and expected duration. Pain level reassessed. Patient is     

      alert, oriented x 3, equal unlabored respirations, skin warm/dry/pink. Patient states       

      feeling better.                                                                             

22:56 Reassessment: Patient appears in no apparent distress at this time. Patient and/or      jb4 

      family updated on plan of care and expected duration. Pain level reassessed. Pt is          

      resting in bed with eyes closed. Respirations are even and unlabored. No s/s of pain or     

      distress are noted.                                                                         

                                                                                             

00:00 Reassessment: Patient appears in no apparent distress at this time. Patient and/or      jb4 

      family updated on plan of care and expected duration. Pain level reassessed. Patient is     

      alert, oriented x 3, equal unlabored respirations, skin warm/dry/pink.                      

                                                                                                  

Vital Signs:                                                                                      

                                                                                             

17:05  / 93; Pulse 136; Resp 18; Temp 98.5; Pulse Ox 98% ; Pain 10/10;                  ll1 

18:00 Pulse 111;                                                                              ss  

19:15  / 102; Resp 17; Pain 4/10;                                                       jb4 

19:15  / 102; Pulse 110; Resp 17; Pulse Ox 97% on R/A; Pain 4/10;                       jb4 

19:50 Pulse 112;                                                                              jb4 

20:00  / 94; Pulse 110; Resp 13; Pulse Ox 96% on R/A;                                   jb4 

20:45  / 95; Pulse 112; Resp 16; Pulse Ox 93% on R/A; Pain 8/10;                        jb4 

21:30  / 79; Pulse 104; Resp 15; Pulse Ox 94% on R/A;                                   jb4 

22:15  / 85; Pulse 113; Resp 17; Pulse Ox 93% on R/A;                                   jb4 

23:00  / 87; Pulse 104; Resp 15; Pulse Ox 95% on R/A; Pain 0/10;                        jb4 

23:45  / 93; Pulse 105; Resp 16; Pulse Ox 95% on R/A;                                   rv  

                                                                                                  

ED Course:                                                                                        

16:58 Patient arrived in ED.                                                                  mr  

17:04 Dino More PA is PHCP.                                                                cp  

17:04 Dino Medley MD is Attending Physician.                                             cp  

17:06 Triage completed.                                                                       ll1 

17:07 Arm band placed on Patient placed in an exam room, on a stretcher.                      ll1 

17:26 Tahir Lepe RN is Primary Nurse.                                                      jl7 

17:30 Patient has correct armband on for positive identification. Placed in gown. Bed in low  jl7 

      position. Call light in reach. Side rails up X 1. Pulse ox on. NIBP on. Warm blanket        

      given.                                                                                      

17:40 Inserted saline lock: 20 gauge in right antecubital area, using aseptic technique.      ss  

      Blood collected.                                                                            

18:58 CT Abd/Pelvis - IV Contrast Only In Process Unspecified.                                EDMS

19:07 Primary Nurse role handed off by Tahir Lepe RN                                       jl7 

19:29 Rodrigo Ravi, RN is Primary Nurse.                                                     jb4 

22:08 Chu Pickard MD is Hospitalizing Provider.                                             

                                                                                             

00:30 No provider procedures requiring assistance completed. Patient admitted, IV remains in  jb4 

      place.                                                                                      

                                                                                                  

Administered Medications:                                                                         

                                                                                             

17:40 Drug: NS 0.9% 500 ml Route: IV; Rate: bolus; Site: right antecubital;                   ss  

17:41 Drug: ProTONIX 40 mg Route: IVP; Site: right antecubital;                               ss  

19:00 Follow up: Response: No adverse reaction                                                jb4 

17:51 Drug: morphine 4 mg Route: IVP; Site: right antecubital;                                ss  

19:15 Follow up:  / 102; Resp 17 bpm; Pain 4/10 Adult; Response: No adverse reaction;   jb4 

      Pain is decreased; RASS: Alert and Calm (0)                                                 

17:51 Drug: Zofran (Ondansetron) 4 mg Route: IVP; Site: right antecubital;                      

19:00 Follow up: Response: No adverse reaction                                                4 

19:15 Drug: NS 0.9% 500 ml Route: IV; Rate: bolus; Site: right antecubital;                   jb4 

19:50 Follow up: Pulse 112 bpm; Response: No adverse reaction; IV Status: Completed infusion; 4 

      IV Intake: 500ml                                                                            

19:45 Drug: NS 0.9% 1000 ml Route: IV; Rate: 100 ml/hr; Site: right antecubital;              4 

                                                                                             

00:30 Follow up: Response: No adverse reaction; IV Status: Completed infusion                 4 

                                                                                             

20:58 Drug: morphine 4 mg {Note: Rass score 0.} Route: IVP; Site: right antecubital;          jb4 

21:30 Follow up: Response: No adverse reaction; Pain is decreased; RASS: Alert and Calm (0)   jb4 

21:00 Drug: NS 0.9% 1000 ml Route: IV; Rate: 1 bolus; Site: right antecubital;                jb4 

22:00 Follow up: Response: No adverse reaction; IV Status: Completed infusion; IV Intake:     jb4 

      1000ml                                                                                      

                                                                                                  

                                                                                                  

Intake:                                                                                           

19:50 IV: 500ml; Total: 500ml.                                                                jb4 

22:00 IV: 1000ml; Total: 1500ml.                                                              jb4 

                                                                                                  

Outcome:                                                                                          

22:09 Decision to Hospitalize by Provider.                                                      

                                                                                             

00:30 Admitted to Med/surg accompanied by nurse, via wheelchair, room 222, with chart.        jb4 

      Condition: stable                                                                           

      Discharge instructions given to patient, Instructed on the need for admit, Demonstrated     

      understanding of instructions.                                                              

00:34 Patient left the ED.                                                                    lp1 

                                                                                                  

Signatures:                                                                                       

Dispatcher [employer]                           ANTOINEJosephine WadeNola, RN                      RN   ss                                                   

Hillary Tatum, RN                         RN   lp1                                                  

Dino More PA PA cp Bryson, James RN                       RN   jb4                                                  

Tahir Lepe RN                        RN   jl7                                                  

Adonis Tony, RN                    RN   Bran Corea RN                       RN   ll1                                                  

                                                                                                  

**************************************************************************************************

## 2020-06-01 NOTE — RAD REPORT
EXAM DESCRIPTION:  CTAbdomen   Pelvis W Contrast - 6/1/2020 6:57 pm

 

CLINICAL HISTORY:  Abdominal pain.

ABD PAIN

 

COMPARISON:  Abdomen   Pelvis W Contrast dated 3/6/2019

 

TECHNIQUE:  Biphasic CT imaging of the abdomen and pelvis was performed with 100 ml non-ionic IV cont
rast.

 

All CT scans are performed using dose optimization technique as appropriate and may include automated
 exposure control or mA/KV adjustment according to patient size.

 

FINDINGS:  Mild linear subsegmental atelectasis is present in both lung bases.Moderate axial hiatal h
ernia.

 

Diffuse fatty liver is present. No intrahepatic biliary dilatation.

 

Moderate inflammatory changes are present involving the pancreas. 3 cm pseudocyst is seen in the irena
on of the pancreatic neck. Dystrophic calcifications are also present in the pancreas with mild pancr
eatic ductal dilatation.

 

The spleen, adrenal glands and kidneys within normal limits. Mild free fluid is seen in the abdomen a
nd pelvis. No free air.

 

No bowel obstruction or abscess.  The appendix is normal.  No evidence of significant lymphadenopathy
.

 

No suspicious bony findings.

 

IMPRESSION:  Moderate acute pancreatitis superimposed on chronic pancreatitis.

 

3 cm pseudocyst in the region of the pancreatic neck.

## 2020-06-02 LAB
ALBUMIN SERPL BCP-MCNC: 2.6 G/DL (ref 3.4–5)
ALP SERPL-CCNC: 81 U/L (ref 45–117)
ALT SERPL W P-5'-P-CCNC: 19 U/L (ref 12–78)
AST SERPL W P-5'-P-CCNC: 15 U/L (ref 15–37)
BUN BLD-MCNC: 6 MG/DL (ref 7–18)
GLUCOSE SERPLBLD-MCNC: 94 MG/DL (ref 74–106)
HCT VFR BLD CALC: 35.5 % (ref 39.6–49)
HDLC SERPL-MCNC: 30 MG/DL (ref 40–60)
INR BLD: 1.19
LDLC SERPL CALC-MCNC: 116 MG/DL (ref ?–130)
LIPASE SERPL-CCNC: 1912 U/L (ref 73–393)
LYMPHOCYTES # SPEC AUTO: 0.9 K/UL (ref 0.7–4.9)
PMV BLD: 8.9 FL (ref 7.6–11.3)
POTASSIUM SERPL-SCNC: 3.3 MMOL/L (ref 3.5–5.1)
RBC # BLD: 3.75 M/UL (ref 4.33–5.43)

## 2020-06-02 RX ADMIN — SODIUM CHLORIDE SCH MLS: 0.9 INJECTION, SOLUTION INTRAVENOUS at 21:45

## 2020-06-02 RX ADMIN — SODIUM CHLORIDE SCH MLS: 0.9 INJECTION, SOLUTION INTRAVENOUS at 09:13

## 2020-06-02 RX ADMIN — SODIUM CHLORIDE SCH MLS: 0.9 INJECTION, SOLUTION INTRAVENOUS at 16:35

## 2020-06-02 RX ADMIN — Medication SCH: at 20:15

## 2020-06-02 RX ADMIN — MORPHINE SULFATE PRN MG: 4 INJECTION, SOLUTION INTRAMUSCULAR; INTRAVENOUS at 09:13

## 2020-06-02 RX ADMIN — MORPHINE SULFATE PRN MG: 4 INJECTION, SOLUTION INTRAMUSCULAR; INTRAVENOUS at 05:19

## 2020-06-02 RX ADMIN — ASPIRIN 81 MG SCH MG: 81 TABLET ORAL at 09:18

## 2020-06-02 RX ADMIN — SODIUM CHLORIDE SCH MLS: 0.9 INJECTION, SOLUTION INTRAVENOUS at 01:17

## 2020-06-02 RX ADMIN — Medication SCH: at 08:40

## 2020-06-02 RX ADMIN — MORPHINE SULFATE PRN MG: 4 INJECTION, SOLUTION INTRAMUSCULAR; INTRAVENOUS at 01:17

## 2020-06-02 RX ADMIN — MORPHINE SULFATE PRN MG: 4 INJECTION, SOLUTION INTRAMUSCULAR; INTRAVENOUS at 13:44

## 2020-06-02 RX ADMIN — TRAMADOL HYDROCHLORIDE SCH MG: 50 TABLET, COATED ORAL at 13:44

## 2020-06-02 RX ADMIN — TRAMADOL HYDROCHLORIDE SCH MG: 50 TABLET, COATED ORAL at 20:11

## 2020-06-02 RX ADMIN — TRAMADOL HYDROCHLORIDE SCH MG: 50 TABLET, COATED ORAL at 09:57

## 2020-06-02 RX ADMIN — MORPHINE SULFATE PRN MG: 4 INJECTION, SOLUTION INTRAMUSCULAR; INTRAVENOUS at 21:45

## 2020-06-02 NOTE — P.HP
Certification for Inpatient


Patient admitted to: Inpatient


With expected LOS: >2 Midnights


Patient will require the following post-hospital care: None


Practitioner: I am a practitioner with admitting privileges, knowledge of 

patient current condition, hospital course, and medical plan of care.


Services: Services provided to patient in accordance with Admission requirements

found in Title 42 Section 412.3 of the Code of Federal Regulations





Patient History


Date of Service: 06/01/20


Reason for admission: Abdominal pain/acute alcoholic pancreatitis


History of Present Illness: 





Patient is a 64-year-old gentleman who came to the hospital with epigastric 

tenderness. Pain started after he was drinking.  He has had multiple episodes in

the past of alcoholic pancreatitis.  He continues to drink any came into the ER 

because the pain was very severe in radiated to his back.  He was found have 

acute pancreatitis and a pseudocyst on his pancreas at the neck.  Patient CT 

scan also revealed moderate pancreatitis.  Patient was started on IV fluids and 

pain control.  Patient is NPO.  Patient was counseled regarding alcohol 

cessation.  Patient be admitted to the hospital for further treatment.


Allergies





No Known Allergies Allergy (Verified 06/02/20 00:42)


   





Home Medications: 








Aspirin Chewable [Aspirin Chewable*] 1 tab PO DAILY 03/06/19 


Cholecalciferol (Vitamin D3) [Vitamin D3] 1 tab PO DAILY 03/06/19 


Albuterol Sulfate [Proair Hfa] 2 puff IH TID PRN #1 [id number].[id number] 03/10/19 


Tramadol HCl [Ultram] 50 mg pe PO TID 06/02/20 


predniSONE [Deltasone] 4 mg PO SEECOM 06/02/20 








- Past Medical/Surgical History


Has patient received pneumonia vaccine in the past: No


Diabetic: No


-: Hypertension


-: COPD


-: GERD


-: Tobacco/alcohol abuse


-: History of alcoholic pancreatitis


-: Stroke


Past Surgical History: Patient denies surgical history


Psychosocial/ Personal History: Patient is a .  He has 1 child.  He does 

not work.





- Family History


  ** Mother


Medical History: Diabetes





- Social History


Smoking Status: Current some day smoker


Alcohol use: Yes


Caffeine use: No


Place of Residence: Home





Review of Systems


10-point ROS is otherwise unremarkable





Physical Examination





- Vital Signs


Temperature: 98.5 F


Blood Pressure: 171/77


Pulse: 107


Respirations: 17


Pulse Ox (%): 95





- Physical Exam


General: Alert, In no apparent distress, Oriented x3


HEENT: Atraumatic, PERRLA, Mucous membr. moist/pink, EOMI, Sclerae nonicteric


Neck: Supple, 2+ carotid pulse no bruit, No LAD, Without JVD or thyroid 

abnormality


Respiratory: Clear to auscultation bilaterally, Normal air movement


Cardiovascular: Regular rate/rhythm, Normal S1 S2, No murmurs


Gastrointestinal: Normal bowel sounds, Soft and benign, Non-distended, No reboun

d, No guarding, Tenderness


Musculoskeletal: No clubbing, No swelling, No tenderness


Integumentary: No rashes


Neurological: Normal gait, Normal speech, Normal strength at 5/5 x4 extr, Normal

 tone, Sensation intact, Cranial nerves 3-12 intact, Normal affect


Lymphatics: No axilla or inguinal lymphadenopathy





Assessment & Plan





- Problems (Diagnosis)


(1) Acute alcoholic pancreatitis


Current Visit: Yes   Status: Acute   





(2) Pancreatic pseudocyst


Current Visit: Yes   Status: Acute   





- Plan





1. Continue with IV hydration 


2. At this time will hold IV antibiotics


3. Continue with pain control


4. NPO


5. Serial H&H, and we will monitor CBC, BMP, LFTs, lipid profile, and lipase 

along with electrolytes.


6. GI and DVT prophylaxis


Discharge Plan: Home


Plan to discharge in: Greater than 2 days





- Advance Directives


Does patient have a Living Will: Yes


Does patient have a Durable POA for Healthcare: Yes





- Code Status/Comfort Care


Code Status Assessed: Yes


Code Status: Full Code


Critical Care: No


Time Spent Managing PTS Care (In Minutes): 45

## 2020-06-02 NOTE — EKG
Test Date:    2020-06-01               Test Time:    19:48:16

Technician:   NEDA                                    

                                                     

MEASUREMENT RESULTS:                                       

Intervals:                                           

Rate:         109                                    

NJ:           128                                    

QRSD:         86                                     

QT:           368                                    

QTc:          495                                    

Axis:                                                

P:            67                                     

NJ:           128                                    

QRS:          72                                     

T:            60                                     

                                                     

INTERPRETIVE STATEMENTS:                                       

                                                     

Sinus tachycardia

Otherwise normal ECG

Compared to ECG 03/06/2019 06:35:20

Sinus rhythm no longer present

Sinus arrhythmia no longer present

Prolonged QT interval no longer present



Electronically Signed On 06-02-20 19:24:09 CDT by Douglas Krishnamurthy

## 2020-06-03 LAB
ALBUMIN SERPL BCP-MCNC: 2.2 G/DL (ref 3.4–5)
ALP SERPL-CCNC: 69 U/L (ref 45–117)
ALT SERPL W P-5'-P-CCNC: 19 U/L (ref 12–78)
AST SERPL W P-5'-P-CCNC: 19 U/L (ref 15–37)
BUN BLD-MCNC: 6 MG/DL (ref 7–18)
GLUCOSE SERPLBLD-MCNC: 99 MG/DL (ref 74–106)
HCT VFR BLD CALC: 32.9 % (ref 39.6–49)
LIPASE SERPL-CCNC: 412 U/L (ref 73–393)
LYMPHOCYTES # SPEC AUTO: 0.8 K/UL (ref 0.7–4.9)
PMV BLD: 8.8 FL (ref 7.6–11.3)
POTASSIUM SERPL-SCNC: 2.7 MMOL/L (ref 3.5–5.1)
RBC # BLD: 3.44 M/UL (ref 4.33–5.43)

## 2020-06-03 RX ADMIN — METOPROLOL TARTRATE SCH MG: 5 INJECTION INTRAVENOUS at 02:05

## 2020-06-03 RX ADMIN — Medication SCH ML: at 21:59

## 2020-06-03 RX ADMIN — Medication SCH ML: at 08:57

## 2020-06-03 RX ADMIN — SODIUM CHLORIDE SCH MLS: 0.9 INJECTION, SOLUTION INTRAVENOUS at 05:58

## 2020-06-03 RX ADMIN — METOPROLOL TARTRATE SCH MG: 5 INJECTION INTRAVENOUS at 02:20

## 2020-06-03 RX ADMIN — ASPIRIN 81 MG SCH MG: 81 TABLET ORAL at 08:57

## 2020-06-03 RX ADMIN — METOPROLOL TARTRATE SCH MG: 5 INJECTION INTRAVENOUS at 02:14

## 2020-06-03 RX ADMIN — TRAMADOL HYDROCHLORIDE SCH: 50 TABLET, COATED ORAL at 21:00

## 2020-06-03 RX ADMIN — TRAMADOL HYDROCHLORIDE SCH: 50 TABLET, COATED ORAL at 14:00

## 2020-06-03 RX ADMIN — SODIUM CHLORIDE SCH MLS: 0.9 INJECTION, SOLUTION INTRAVENOUS at 15:16

## 2020-06-03 RX ADMIN — MORPHINE SULFATE PRN MG: 4 INJECTION, SOLUTION INTRAMUSCULAR; INTRAVENOUS at 10:53

## 2020-06-03 RX ADMIN — MORPHINE SULFATE PRN MG: 4 INJECTION, SOLUTION INTRAMUSCULAR; INTRAVENOUS at 15:14

## 2020-06-03 RX ADMIN — SODIUM CHLORIDE SCH MLS: 0.9 INJECTION, SOLUTION INTRAVENOUS at 21:58

## 2020-06-03 RX ADMIN — TRAMADOL HYDROCHLORIDE SCH: 50 TABLET, COATED ORAL at 08:57

## 2020-06-03 RX ADMIN — POTASSIUM CHLORIDE SCH MLS: 200 INJECTION, SOLUTION INTRAVENOUS at 23:59

## 2020-06-03 RX ADMIN — MORPHINE SULFATE PRN MG: 4 INJECTION, SOLUTION INTRAMUSCULAR; INTRAVENOUS at 23:15

## 2020-06-03 RX ADMIN — CHOLECALCIFEROL TAB 25 MCG (1000 UNIT) SCH UNIT: 25 TAB at 08:57

## 2020-06-03 RX ADMIN — POTASSIUM CHLORIDE SCH MLS: 200 INJECTION, SOLUTION INTRAVENOUS at 21:59

## 2020-06-03 RX ADMIN — MORPHINE SULFATE PRN MG: 4 INJECTION, SOLUTION INTRAMUSCULAR; INTRAVENOUS at 19:25

## 2020-06-03 NOTE — P.PN
Subjective


Date of Service: 06/03/20


  Patient's lipase is still elevated.  However symptoms are improving.  Continue

with pain control at this time.  Repeat lipase in the morning and maybe start 

clear liquid diet later today





Review of Systems


10-point ROS is otherwise unremarkable





Physical Examination





- Vital Signs


Temperature: 96.8 F


Blood Pressure: 154/74


Pulse: 98


Respirations: 18


Pulse Ox (%): 91





- Physical Exam


General: Alert, In no apparent distress, Oriented x3


Respiratory: Clear to auscultation bilaterally, Normal air movement


Cardiovascular: Regular rate/rhythm, Normal S1 S2, No murmurs


Gastrointestinal: Normal bowel sounds, Soft and benign, Non-distended, 

Tenderness


Musculoskeletal: No clubbing, No swelling, No contractures





- Studies


Medications List Reviewed: Yes





Assessment & Plan





- Problems (Diagnosis)


(1) Acute alcoholic pancreatitis


Current Visit: Yes   Status: Acute   





(2) Pancreatic pseudocyst


Current Visit: Yes   Status: Acute   





- Plan





Continue with current plan of care as mentioned below


1. Continue with IV hydration; start clear liquid diet 


2. Hold IV antibiotics


3. Continue with pain control


4. Advanced to ice chips and then a clear liquid diet if patient does well 


5. Serial H&H, and we will monitor CBC, BMP, LFTs, lipid profile, and lipase 

along with electrolytes.


Discharge Plan: Home


Plan to discharge in: 48 Hours





- Advance Directives


Does patient have a Living Will: Yes


Does patient have a Durable POA for Healthcare: Yes





- Code Status/Comfort Care


Code Status: Full Code


Critical Care: No


Time Spent Managing PTS Care (In Minutes): 30

## 2020-06-03 NOTE — P.PN
Subjective


Date of Service: 06/02/20





Patient's pain is improving.  No new complaints at this time.





Review of Systems


10-point ROS is otherwise unremarkable





Physical Examination





- Vital Signs


Temperature: 96.8 F


Blood Pressure: 154/74


Pulse: 98


Respirations: 18


Pulse Ox (%): 91





- Physical Exam


General: Alert, In no apparent distress, Oriented x3


Respiratory: Clear to auscultation bilaterally, Normal air movement


Cardiovascular: Regular rate/rhythm, Normal S1 S2, No murmurs


Gastrointestinal: Normal bowel sounds, Soft and benign, Non-distended, 

Tenderness (Epigastric tenderness)


Musculoskeletal: No clubbing, No swelling, No tenderness


Neurological: Normal strength at 5/5 x4 extr





- Studies


Medications List Reviewed: Yes





Assessment & Plan





- Problems (Diagnosis)


(1) Acute alcoholic pancreatitis


Current Visit: Yes   Status: Acute   





(2) Pancreatic pseudocyst


Current Visit: Yes   Status: Acute   





- Plan





1. Continue with IV hydration; continue with bolusing patient


2. Hold IV antibiotics


3. Continue with pain control


4. NPO


5. Serial H&H, and we will monitor CBC, BMP, LFTs, lipid profile, and lipase 

along with electrolytes.


6. Start ice chips at this time


7. GI and DVT prophylaxis





- Advance Directives


Does patient have a Living Will: Yes


Does patient have a Durable POA for Healthcare: Yes





- Code Status/Comfort Care


Code Status: Full Code


Critical Care: No


Time Spent Managing PTS Care (In Minutes): 30

## 2020-06-04 LAB
BLD SMEAR INTERP: (no result)
BUN BLD-MCNC: 7 MG/DL (ref 7–18)
GLUCOSE SERPLBLD-MCNC: 87 MG/DL (ref 74–106)
HCT VFR BLD CALC: 31.2 % (ref 39.6–49)
LIPASE SERPL-CCNC: 230 U/L (ref 73–393)
LYMPHOCYTES # SPEC AUTO: 0.8 K/UL (ref 0.7–4.9)
MORPHOLOGY BLD-IMP: (no result)
PMV BLD: 9 FL (ref 7.6–11.3)
POTASSIUM SERPL-SCNC: 3.5 MMOL/L (ref 3.5–5.1)
RBC # BLD: 3.24 M/UL (ref 4.33–5.43)

## 2020-06-04 RX ADMIN — MORPHINE SULFATE PRN MG: 4 INJECTION, SOLUTION INTRAMUSCULAR; INTRAVENOUS at 14:00

## 2020-06-04 RX ADMIN — POTASSIUM CHLORIDE SCH MLS: 200 INJECTION, SOLUTION INTRAVENOUS at 01:53

## 2020-06-04 RX ADMIN — SODIUM CHLORIDE SCH MLS: 0.9 INJECTION, SOLUTION INTRAVENOUS at 19:33

## 2020-06-04 RX ADMIN — SODIUM CHLORIDE SCH MLS: 0.9 INJECTION, SOLUTION INTRAVENOUS at 11:57

## 2020-06-04 RX ADMIN — TRAMADOL HYDROCHLORIDE SCH: 50 TABLET, COATED ORAL at 21:00

## 2020-06-04 RX ADMIN — MORPHINE SULFATE PRN MG: 4 INJECTION, SOLUTION INTRAMUSCULAR; INTRAVENOUS at 09:05

## 2020-06-04 RX ADMIN — MORPHINE SULFATE PRN MG: 4 INJECTION, SOLUTION INTRAMUSCULAR; INTRAVENOUS at 21:25

## 2020-06-04 RX ADMIN — MORPHINE SULFATE PRN MG: 4 INJECTION, SOLUTION INTRAMUSCULAR; INTRAVENOUS at 17:57

## 2020-06-04 RX ADMIN — SODIUM CHLORIDE SCH MLS: 0.9 INJECTION, SOLUTION INTRAVENOUS at 04:21

## 2020-06-04 RX ADMIN — TRAMADOL HYDROCHLORIDE SCH: 50 TABLET, COATED ORAL at 13:10

## 2020-06-04 RX ADMIN — TRAMADOL HYDROCHLORIDE SCH: 50 TABLET, COATED ORAL at 07:51

## 2020-06-04 RX ADMIN — Medication SCH ML: at 07:50

## 2020-06-04 RX ADMIN — Medication SCH ML: at 21:24

## 2020-06-04 RX ADMIN — MORPHINE SULFATE PRN MG: 4 INJECTION, SOLUTION INTRAMUSCULAR; INTRAVENOUS at 04:21

## 2020-06-04 RX ADMIN — DOCUSATE SODIUM PRN MG: 100 CAPSULE, LIQUID FILLED ORAL at 11:56

## 2020-06-04 RX ADMIN — ASPIRIN 81 MG SCH MG: 81 TABLET ORAL at 07:50

## 2020-06-04 RX ADMIN — CHOLECALCIFEROL TAB 25 MCG (1000 UNIT) SCH UNIT: 25 TAB at 07:50

## 2020-06-05 VITALS — DIASTOLIC BLOOD PRESSURE: 60 MMHG | TEMPERATURE: 97.8 F | SYSTOLIC BLOOD PRESSURE: 126 MMHG

## 2020-06-05 VITALS — OXYGEN SATURATION: 94 %

## 2020-06-05 LAB
BUN BLD-MCNC: 5 MG/DL (ref 7–18)
GLUCOSE SERPLBLD-MCNC: 89 MG/DL (ref 74–106)
POTASSIUM SERPL-SCNC: 3 MMOL/L (ref 3.5–5.1)

## 2020-06-05 RX ADMIN — MORPHINE SULFATE PRN MG: 4 INJECTION, SOLUTION INTRAMUSCULAR; INTRAVENOUS at 05:21

## 2020-06-05 RX ADMIN — Medication SCH ML: at 09:03

## 2020-06-05 RX ADMIN — SODIUM CHLORIDE SCH: 0.9 INJECTION, SOLUTION INTRAVENOUS at 14:37

## 2020-06-05 RX ADMIN — MORPHINE SULFATE PRN MG: 4 INJECTION, SOLUTION INTRAMUSCULAR; INTRAVENOUS at 01:20

## 2020-06-05 RX ADMIN — DOCUSATE SODIUM PRN MG: 100 CAPSULE, LIQUID FILLED ORAL at 09:04

## 2020-06-05 RX ADMIN — CHOLECALCIFEROL TAB 25 MCG (1000 UNIT) SCH UNIT: 25 TAB at 09:04

## 2020-06-05 RX ADMIN — SODIUM CHLORIDE SCH: 0.9 INJECTION, SOLUTION INTRAVENOUS at 05:20

## 2020-06-05 RX ADMIN — ASPIRIN 81 MG SCH MG: 81 TABLET ORAL at 09:04

## 2020-06-05 RX ADMIN — TRAMADOL HYDROCHLORIDE SCH: 50 TABLET, COATED ORAL at 14:00

## 2020-06-05 RX ADMIN — TRAMADOL HYDROCHLORIDE SCH: 50 TABLET, COATED ORAL at 09:00

## 2020-06-05 RX ADMIN — SODIUM CHLORIDE SCH MLS: 0.9 INJECTION, SOLUTION INTRAVENOUS at 03:36

## 2020-06-05 RX ADMIN — MORPHINE SULFATE PRN MG: 4 INJECTION, SOLUTION INTRAMUSCULAR; INTRAVENOUS at 09:39

## 2020-06-05 NOTE — P.PN
Subjective


Date of Service: 06/04/20


 lipase is normalized.  Will go ahead and start a diet and see how patient does.

 The pain is controlled then we possibly can discharge later today.





Review of Systems


10-point ROS is otherwise unremarkable





Physical Examination





- Vital Signs


Temperature: 96.8 F


Blood Pressure: 154/74


Pulse: 98


Respirations: 18


Pulse Ox (%): 91





- Physical Exam


General: Alert, In no apparent distress, Oriented x3


Respiratory: Clear to auscultation bilaterally, Normal air movement


Cardiovascular: Regular rate/rhythm, Normal S1 S2, Systolic murmur


Gastrointestinal: Normal bowel sounds, Soft and benign, Non-distended, 

Tenderness


Musculoskeletal: No clubbing, No swelling


Neurological: Normal gait, Normal speech, Normal strength at 5/5 x4 extr, Normal

tone, Sensation intact, Cranial nerves 3-12 intact





- Studies


Medications List Reviewed: Yes





Assessment & Plan





- Problems (Diagnosis)


(1) Acute alcoholic pancreatitis


Current Visit: Yes   Status: Acute   





(2) Pancreatic pseudocyst


Current Visit: Yes   Status: Acute   





- Plan





Continue with current plan of care as mentioned below


1. Advanced diet as tolerated


2. Monitor pain closely


3. Continue monitoring labs in the morning


4. Possible discharge home if doing well today





- Advance Directives


Does patient have a Living Will: Yes


Does patient have a Durable POA for Healthcare: Yes





- Code Status/Comfort Care


Code Status: Full Code


Critical Care: No


Time Spent Managing PTS Care (In Minutes): 30

## 2020-06-09 NOTE — P.DS
Discharge Date: 06/05/20


Disposition: ROUTINE DISCHARGE


Discharge Condition: GOOD


Reason for Admission: Abdominal pain/acute alcoholic pancreatitis





- Problems


(1) Acute alcoholic pancreatitis


Status: Acute   





(2) Pancreatic pseudocyst


Status: Acute   


Brief History of Present Illness: 





Patient is a 64-year-old gentleman who came to the hospital with epigastric 

tenderness. Pain started after he was drinking.  He has had multiple episodes in

the past of alcoholic pancreatitis.  He continues to drink any came into the ER 

because the pain was very severe in radiated to his back.  He was found have 

acute pancreatitis and a pseudocyst on his pancreas at the neck.  Patient CT 

scan also revealed moderate pancreatitis.  Patient was started on IV fluids and 

pain control.  Patient is NPO.  Patient was counseled regarding alcohol 

cessation.  Patient be admitted to the hospital for further treatment.


Hospital Course: 





Patient's symptoms have improved. At this time patient is doing much better. 

Patient is tolerating diet. Patient is not having pain. Patient is stable for 

discharge home with outpatient follow up. 


Vital Signs/Physical Exam: 














Temp Pulse Resp BP Pulse Ox


 


 97.8 F   86   16   126/60   91 


 


 06/05/20 16:00  06/05/20 16:00  06/05/20 16:00  06/05/20 16:00  06/05/20 16:00








General: Alert, In no apparent distress, Oriented x3


Laboratory Data at Discharge: 














WBC  9.8 K/uL (4.3-10.9)  D 06/04/20  04:49    


 


Hgb  10.5 g/dL (13.6-17.9)  L  06/04/20  04:49    


 


Hct  31.2 % (39.6-49.0)  L  06/04/20  04:49    


 


Plt Count  233 K/uL (152-406)   06/04/20  04:49    


 


PT  14.0 SECONDS (9.5-12.5)  H  06/02/20  05:53    


 


INR  1.19   06/02/20  05:53    


 


APTT  26.3 SECONDS (24.3-36.9)   06/02/20  05:53    


 


Sodium  135 mmol/L (136-145)  L  06/05/20  04:37    


 


Potassium  Cancelled   06/05/20  15:00    


 


BUN  5 mg/dL (7-18)  L  06/05/20  04:37    


 


Creatinine  0.36 mg/dL (0.55-1.3)  L  06/05/20  04:37    


 


Glucose  89 mg/dL ()   06/05/20  04:37    


 


Total Bilirubin  0.5 mg/dL (0.2-1.0)   06/03/20  18:56    


 


AST  19 U/L (15-37)   06/03/20  18:56    


 


ALT  19 U/L (12-78)   06/03/20  18:56    


 


Alkaline Phosphatase  69 U/L ()   06/03/20  18:56    


 


Triglycerides  102 mg/dL (<150)   06/02/20  05:53    


 


Cholesterol  166 mg/dL (<200)   06/02/20  05:53    


 


HDL Cholesterol  30 mg/dL (40-60)  L  06/02/20  05:53    


 


Cholesterol/HDL Ratio  5.53   06/02/20  05:53    


 


Lipase  230 U/L ()   06/04/20  04:49    








Home Medications: 








Aspirin Chewable [Aspirin Chewable*] 1 tab PO DAILY 03/06/19 


Cholecalciferol (Vitamin D3) [Vitamin D3] 1 tab PO DAILY 03/06/19 


Albuterol Sulfate [Proair Hfa] 2 puff IH TID PRN #1 hfa.aer.ad 03/10/19 


Tramadol HCl [Ultram] 50 mg pe PO TID 06/02/20 


Codeine/APAP [Tylenol W/Codeine #3 tab] 1 tab PO Q6HP PRN #30 tab 06/05/20 


Metoprolol Tartrate [Lopressor] 25 mg PO BID #60 tab 06/05/20 





New Medications: 


Metoprolol Tartrate [Lopressor] 25 mg PO BID #60 tab


Codeine/APAP [Tylenol W/Codeine #3 tab] 1 tab PO Q6HP PRN #30 tab


 PRN Reason: Pain


Patient Discharge Instructions: OK TO DC IV AND DC HOME.  PATIENT SHOULD REFRAIN

FROM ALCOHOL AS HE IS DEVELOPING CHRONIC PANCREATITIS.  LOW-FAT DIET IS RE

COMMENDED.  FOLLOW-UP WITH PRIMARY CARE PROVIDER IN 1-2 WEEKS.  FOLLOW-UP WITH 

gastroenterologist IN 1-2 WEEKS.  RETURN TO THE ER IF symptoms worsen.  CALL or 

TEXT DR. DUQUE [phone] IF ANY QUESTIONS REGARDING HOSPITAL STAY.  PLEASE 

CALL THE FLOOR [phone] IF ANY MEDICATION OR NURSING QUESTIONS.


Diet: Low-fat diet


Activity: Ad mike


Followup: 


Carlo Menezes MD [ASSOCIATE-ACTIVE - CAN ADMIT] - 


Time spent managing pt's care (in minutes): 20

## 2020-06-16 ENCOUNTER — HOSPITAL ENCOUNTER (INPATIENT)
Dept: HOSPITAL 97 - ER | Age: 65
LOS: 6 days | Discharge: HOME | DRG: 439 | End: 2020-06-22
Attending: INTERNAL MEDICINE | Admitting: INTERNAL MEDICINE
Payer: COMMERCIAL

## 2020-06-16 VITALS — BODY MASS INDEX: 18.3 KG/M2

## 2020-06-16 DIAGNOSIS — Z11.59: ICD-10-CM

## 2020-06-16 DIAGNOSIS — Z79.82: ICD-10-CM

## 2020-06-16 DIAGNOSIS — F10.10: ICD-10-CM

## 2020-06-16 DIAGNOSIS — F17.210: ICD-10-CM

## 2020-06-16 DIAGNOSIS — K86.3: ICD-10-CM

## 2020-06-16 DIAGNOSIS — K86.0: ICD-10-CM

## 2020-06-16 DIAGNOSIS — Z79.899: ICD-10-CM

## 2020-06-16 DIAGNOSIS — Z86.73: ICD-10-CM

## 2020-06-16 DIAGNOSIS — R00.0: ICD-10-CM

## 2020-06-16 DIAGNOSIS — K85.20: Primary | ICD-10-CM

## 2020-06-16 DIAGNOSIS — K21.9: ICD-10-CM

## 2020-06-16 DIAGNOSIS — E44.1: ICD-10-CM

## 2020-06-16 DIAGNOSIS — I10: ICD-10-CM

## 2020-06-16 DIAGNOSIS — E87.6: ICD-10-CM

## 2020-06-16 DIAGNOSIS — J44.9: ICD-10-CM

## 2020-06-16 LAB
ALBUMIN SERPL BCP-MCNC: 3.1 G/DL (ref 3.4–5)
ALP SERPL-CCNC: 80 U/L (ref 45–117)
ALT SERPL W P-5'-P-CCNC: 21 U/L (ref 12–78)
AST SERPL W P-5'-P-CCNC: 18 U/L (ref 15–37)
BUN BLD-MCNC: 19 MG/DL (ref 7–18)
GLUCOSE SERPLBLD-MCNC: 127 MG/DL (ref 74–106)
HCT VFR BLD CALC: 36.1 % (ref 39.6–49)
LIPASE SERPL-CCNC: 4331 U/L (ref 73–393)
LYMPHOCYTES # SPEC AUTO: 1.3 K/UL (ref 0.7–4.9)
PMV BLD: 8.1 FL (ref 7.6–11.3)
POTASSIUM SERPL-SCNC: 2.9 MMOL/L (ref 3.5–5.1)
RBC # BLD: 3.81 M/UL (ref 4.33–5.43)
UA COMPLETE W REFLEX CULTURE PNL UR: (no result)
UA DIPSTICK W REFLEX MICRO PNL UR: (no result)

## 2020-06-16 PROCEDURE — 81015 MICROSCOPIC EXAM OF URINE: CPT

## 2020-06-16 PROCEDURE — 96374 THER/PROPH/DIAG INJ IV PUSH: CPT

## 2020-06-16 PROCEDURE — 80048 BASIC METABOLIC PNL TOTAL CA: CPT

## 2020-06-16 PROCEDURE — 80053 COMPREHEN METABOLIC PANEL: CPT

## 2020-06-16 PROCEDURE — 80061 LIPID PANEL: CPT

## 2020-06-16 PROCEDURE — 83690 ASSAY OF LIPASE: CPT

## 2020-06-16 PROCEDURE — 71046 X-RAY EXAM CHEST 2 VIEWS: CPT

## 2020-06-16 PROCEDURE — 80076 HEPATIC FUNCTION PANEL: CPT

## 2020-06-16 PROCEDURE — 83735 ASSAY OF MAGNESIUM: CPT

## 2020-06-16 PROCEDURE — 81003 URINALYSIS AUTO W/O SCOPE: CPT

## 2020-06-16 PROCEDURE — 36415 COLL VENOUS BLD VENIPUNCTURE: CPT

## 2020-06-16 PROCEDURE — 80320 DRUG SCREEN QUANTALCOHOLS: CPT

## 2020-06-16 PROCEDURE — 76705 ECHO EXAM OF ABDOMEN: CPT

## 2020-06-16 PROCEDURE — 99285 EMERGENCY DEPT VISIT HI MDM: CPT

## 2020-06-16 PROCEDURE — 84145 PROCALCITONIN (PCT): CPT

## 2020-06-16 PROCEDURE — 96361 HYDRATE IV INFUSION ADD-ON: CPT

## 2020-06-16 PROCEDURE — 84132 ASSAY OF SERUM POTASSIUM: CPT

## 2020-06-16 PROCEDURE — 85025 COMPLETE CBC W/AUTO DIFF WBC: CPT

## 2020-06-16 PROCEDURE — 83605 ASSAY OF LACTIC ACID: CPT

## 2020-06-16 RX ADMIN — SODIUM CHLORIDE SCH MLS: 0.9 INJECTION, SOLUTION INTRAVENOUS at 22:54

## 2020-06-16 RX ADMIN — POTASSIUM CHLORIDE SCH MLS: 200 INJECTION, SOLUTION INTRAVENOUS at 20:23

## 2020-06-16 RX ADMIN — MORPHINE SULFATE PRN MG: 2 INJECTION, SOLUTION INTRAMUSCULAR; INTRAVENOUS at 22:53

## 2020-06-16 RX ADMIN — MORPHINE SULFATE PRN MG: 2 INJECTION, SOLUTION INTRAMUSCULAR; INTRAVENOUS at 18:42

## 2020-06-16 RX ADMIN — NICOTINE SCH MG: 21 PATCH TRANSDERMAL at 15:55

## 2020-06-16 RX ADMIN — Medication SCH ML: at 20:23

## 2020-06-16 RX ADMIN — POTASSIUM CHLORIDE SCH MLS: 200 INJECTION, SOLUTION INTRAVENOUS at 15:02

## 2020-06-16 RX ADMIN — MORPHINE SULFATE PRN MG: 2 INJECTION, SOLUTION INTRAMUSCULAR; INTRAVENOUS at 15:03

## 2020-06-16 RX ADMIN — ENOXAPARIN SODIUM SCH MG: 40 INJECTION SUBCUTANEOUS at 15:55

## 2020-06-16 RX ADMIN — SODIUM CHLORIDE SCH MLS: 0.9 INJECTION, SOLUTION INTRAVENOUS at 15:03

## 2020-06-16 RX ADMIN — MOMETASONE FUROATE AND FORMOTEROL FUMARATE DIHYDRATE SCH: 100; 5 AEROSOL RESPIRATORY (INHALATION) at 21:00

## 2020-06-16 RX ADMIN — SODIUM CHLORIDE SCH MG: 0.9 INJECTION, SOLUTION INTRAVENOUS at 20:23

## 2020-06-16 RX ADMIN — POTASSIUM CHLORIDE SCH MLS: 200 INJECTION, SOLUTION INTRAVENOUS at 18:13

## 2020-06-16 NOTE — RAD REPORT
EXAM DESCRIPTION:  US - Abdomen Exam Limited - 6/16/2020 11:40 am

 

CLINICAL HISTORY:  ABD PAIN

 

COMPARISON:  Abdomen   Pelvis W Contrast dated 6/1/2020

 

FINDINGS:  No gallstones, sludge or other abnormalities within the gallbladder lumen. There is no wal
l thickening or pericholecystic fluid. Trace amount of ascites seen in the right upper quadrant.

 

No common duct stone or biliary tree dilatation identified.

 

Liver and pancreas are not assessed on this study.

 

IMPRESSION:  Normal gallbladder and biliary tree ultrasound.

## 2020-06-16 NOTE — EDPHYS
Physician Documentation                                                                           

 [facility]                                                                 

Name: Dandy Garnica                                                                                  

Age: 64 yrs                                                                                       

Sex: Male                                                                                         

: 1955                                                                                   

MRN: [id number]                                                                                   

Arrival Date: 2020                                                                          

Time: 08:50                                                                                       

Account#: [id number]                                                                            

Bed 15                                                                                            

Private MD:                                                                                       

ED Physician Dino Medley                                                                      

HPI:                                                                                              

                                                                                             

09:27 This 64 yrs old  Male presents to ER via Ambulatory with complaints of Pain,   jmm 

      Nausea/Vomiting, DEHYDRATION.                                                               

09:27 The patient presents with abdominal pain. Onset: The symptoms/episode began/occurred    jmm 

      gradually, 3 day(s) ago. The symptoms do not radiate. Associated signs and symptoms:        

      Pertinent positives: nausea and vomiting. This is a 64 year old male with a history of      

      htn, copd, that presents to the ED with complaints of ongoing abdominal pain beginning      

      approx 2 week ago. Patient states he was recently discharged for pancreatitis. Patient      

      states he has been unable to keep fluids down since this past weekend. Denies recent        

      ETOH use. .                                                                                 

                                                                                                  

Historical:                                                                                       

- Allergies:                                                                                      

09:01 No Known Allergies;                                                                     hb  

- PMHx:                                                                                           

09:01 Back pain; COPD; Hypertension; Pancreatitis;                                            hb  

                                                                                                  

- Immunization history:: Adult Immunizations up to date.                                          

- Social history:: Smoking status: Patient reports the use of cigarette tobacco                   

  products, smokes one-half pack cigarettes per day.                                              

                                                                                                  

                                                                                                  

ROS:                                                                                              

09:27 Constitutional: Negative for fever, chills, and weight loss, Cardiovascular: Negative   jmm 

      for chest pain, palpitations, and edema, Respiratory: Negative for shortness of breath,     

      cough, wheezing, and pleuritic chest pain.                                                  

09:27 Abdomen/GI: Positive for abdominal pain, vomiting.                                          

09:27 All other systems are negative.                                                             

                                                                                                  

Exam:                                                                                             

09:27 Constitutional:  This is a well developed, well nourished patient who is awake, alert,  jmm 

      and in no acute distress. Head/Face:  atraumatic. Eyes:  EOMI, no conjunctival erythema     

      appreciated ENT:  Moist Mucus Membranes Neck:  Trachea midline, Supple Chest/axilla:        

      Normal chest wall appearance and motion.   Cardiovascular:  Regular rate and rhythm.        

      No edema appreciated Respiratory:  Normal respirations, no respiratory distress             

      appreciated                                                                                 

09:27 Back:  Normal ROM Skin:  General appearance color normal MS/ Extremity:  Moves all          

      extremities, no obvious deformities appreciated, no edema noted to the lower                

      extremities  Neuro:  Awake and alert, normal gait Psych:  Behavior is normal, Mood is       

      normal, Patient is cooperative and pleasant                                                 

09:27 Abdomen/GI: Inspection: abdomen appears normal, Bowel sounds: normal, Palpation: soft,      

      mild abdominal tenderness, in the suprapubic area, right lower quadrant and left lower      

      quadrant.                                                                                   

                                                                                                  

Vital Signs:                                                                                      

08:57 BP 91 / 69; Pulse 116; Resp 18; Temp 98.4; Pulse Ox 97% on R/A; Weight 61.23 kg; Height hb  

      5 ft. 10 in. (177.80 cm); Pain 8/10;                                                        

09:42  / 78; Pulse 97; Resp 16; Pulse Ox 100% ;                                         sv  

08:57 Body Mass Index 19.37 (61.23 kg, 177.80 cm)                                             hb  

                                                                                                  

MDM:                                                                                              

09:00 Patient medically screened.                                                             [facility] 

11:37 Data reviewed: vital signs, nurses notes. Counseling: I had a detailed discussion with  colleen 

      the patient and/or guardian regarding: the historical points, exam findings, and any        

      diagnostic results supporting the discharge/admit diagnosis, lab results, the need for      

      further work-up and treatment in the hospital. ED course: I discussed the patient with      

      Dr. Patton whom accepted admission. .                                                       

                                                                                                  

                                                                                             

09:03 Order name: Basic Metabolic Panel; Complete Time: 10:07                                 [facility] 

                                                                                             

09:03 Order name: CBC with Diff; Complete Time: 09:30                                         [facility] 

                                                                                             

09:03 Order name: Hepatic Function; Complete Time: 10:07                                      [facility] 

                                                                                             

09:03 Order name: Lipase; Complete Time: 10:07                                                [facility] 

                                                                                             

09:03 Order name: Lactate; Complete Time: 09:59                                               [facility] 

                                                                                             

09:18 Order name: ETOH Level; Complete Time: 10:07                                            [facility] 

                                                                                             

09:03 Order name: IV Saline Lock; Complete Time: 09:31                                        [facility] 

                                                                                             

09:03 Order name: Labs collected and sent; Complete Time: :31                               [facility] 

                                                                                             

10:10 Order name: US Abdomen Limited; Complete Time: 11:59                                    [facility] 

                                                                                                  

Administered Medications:                                                                         

09:20 Drug: Lactated Ringers Solution 1000 ml Route: IV; Rate: 1000 bolus; Site: right        sv  

      forearm;                                                                                    

10:00 Follow up: Response: No adverse reaction; IV Status: Completed infusion; IV Intake:     sv  

      1000ml                                                                                      

09:20 Drug: Zofran (Ondansetron) 4 mg Route: IVP; Site: right forearm;                        sv  

10:00 Follow up: Response: No adverse reaction; Marked relief of symptoms                     sv  

                                                                                                  

                                                                                                  

Disposition:                                                                                      

15:45 Co-signature as Attending Physician, Dino Medley MD I agree with the assessment and  [facility] 

      plan of care.                                                                               

                                                                                                  

Disposition:                                                                                      

20 11:39 Hospitalization ordered by Sohail Patton for Inpatient Admission. Preliminary     

  diagnosis is Acute pancreatitis.                                                                

- Bed requested for Telemetry/MedSurg (Inpatient).                                                

- Status is Inpatient Admission.                                                              hb  

- Condition is Stable.                                                                            

- Problem is an acute exacerbation.                                                               

- Symptoms have improved.                                                                         

                                                                                                  

                                                                                                  

                                                                                                  

Signatures:                                                                                       

Dispatcher [employer]                           EDMS                                                 

Jackie Becerra, RN                    RN   Dino Du MD MD cha Mickail, Joel, PA PA   [facility]                                                  

Choco Mackey, MANSOORP-C                      FNP-Cla1                                                  

Laura Calderon, RN                     RN                                                      

Farhan Ahn RN                       RN   ja1                                                  

                                                                                                  

Corrections: (The following items were deleted from the chart)                                    

12:33 11:39 Hospitalization Ordered by Sohail Patton DO for Inpatient Admission. Preliminary  ja1 

      diagnosis is Acute pancreatitis. Bed requested for Telemetry/MedSurg (Inpatient).           

      Status is Inpatient Admission. Condition is Stable. Problem is an acute exacerbation.       

      Symptoms have improved. gia                                                                 

14:25 12:33 2020 11:39 Hospitalization Ordered by Sohail Patton DO for Inpatient        hb  

      Admission. Preliminary diagnosis is Acute pancreatitis. Bed requested for                   

      Telemetry/MedSurg (Inpatient). Status is Inpatient Admission. Condition is Stable.          

      Problem is an acute exacerbation. Symptoms have improved. ja1                               

                                                                                                  

**************************************************************************************************

## 2020-06-16 NOTE — P.HP
Certification for Inpatient


Patient admitted to: Observation


With expected LOS: <2 Midnights


Patient will require the following post-hospital care: None


Practitioner: I am a practitioner with admitting privileges, knowledge of 

patient current condition, hospital course, and medical plan of care.


Services: Services provided to patient in accordance with Admission requirements

found in Title 42 Section 412.3 of the Code of Federal Regulations





<Choco Mackey - Last Filed: 06/16/20 12:50>


Patient admitted to: Inpatient


With expected LOS: >2 Midnights





<Sohail Patton - Last Filed: 06/16/20 15:43>





Patient History


Date of Service: 06/16/20


Primary Care Provider: none


Reason for admission: Acute on chronic pancreatitis


History of Present Illness: 





64-year-old  male with medical history including COPD, hypertension, 

alcoholic pancreatitis presented to the emergency department with a 3 day 

history of generalized achy abdominal pain.  Patient reports that he has not 

been able to tolerate anything by mouth at home.  Patient was evaluated in the 

emergency department and found to have an elevated white blood cell count at 15 

and an elevated lipase at 4000.  Patient had a CT scan at the beginning of this 

month that showed that he had a 3 cm pancreatic pseudocyst in the pancreatic 

neck.  ER provider wishes to admit this patient for further evaluation and 

management.





When I saw the patient in the emergency department he appeared uncomfortable.  

Patient did not appear septic, all signs within normal limits aside from mild 

tachycardia at 105.  Patient reports that he has not had an alcoholic beverage 

in the past Couple of weeks.  Patient does admit to smoking about 3-4 

cigarettes per day.  Patient will be admitted for further evaluation and 

management of this condition.


Home medications list reviewed: Yes





- Past Medical/Surgical History


Has patient received pneumonia vaccine in the past: No


Diabetic: No


-: Hypertension


-: COPD


-: GERD


-: Tobacco/alcohol abuse


-: History of alcoholic pancreatitis


-: Stroke


Past Surgical History: Reviewed- Non-Contributory


Psychosocial/ Personal History: Patient is a .  He has 1 child.  He does 

not work.





- Family History


  ** Mother


-: Diabetes





- Social History


Smoking Status: Current every day smoker


Counseled patient to stop smoking for: less than 10 minutes


Alcohol use: Yes


CD- Drugs: No


Caffeine use: No


Place of Residence: Home





<Choco Mackey - Last Filed: 06/16/20 12:50>


Date of Service: 06/16/20


History of Present Illness: 





Patient seen and examined.  Reviewed history along with patient.  Agree with 

evaluation by nurse practitioner.  Patient admits drinking last week.  Patient 

understands that he needs to quit due to recurrent pancreatitis.





<Sohail Patton - Last Filed: 06/16/20 15:43>


Allergies





No Known Allergies Allergy (Verified 06/02/20 00:42)


   





Home Medications: 








Aspirin Chewable [Aspirin Chewable*] 1 tab PO DAILY 03/06/19 


Cholecalciferol (Vitamin D3) [Vitamin D3] 1 tab PO DAILY 03/06/19 


Tramadol HCl [Ultram] 50 mg pe PO BID 06/02/20 


Acetaminophen [Tylenol Extra Strength] 1,000 mg PO BID PRN 06/16/20 








Review of Systems


General: Unremarkable


Eyes: Unremarkable


ENT: Unremarkable


Respiratory: Unremarkable


Cardiovascular: Unremarkable


Gastrointestinal: Nausea, Vomiting, Abdominal Pain


Genitourinary: Unremarkable


Musculoskeletal: Unremarkable


Integumentary: Unremarkable


Neurological: Unremarkable


Lymphatics: Unremarkable





<Choco Mackey - Last Filed: 06/16/20 12:50>





Physical Examination





- Physical Exam


General: Alert, In no apparent distress, Oriented x3


HEENT: Atraumatic, Normocephalic


Neck: Supple


Respiratory: Clear to auscultation bilaterally


Cardiovascular: No edema, Normal S1 S2


Capillary refill: <2 Seconds


Gastrointestinal: Normal bowel sounds, Tenderness (Moderate generalized 

abdominal tenderness)


Musculoskeletal: No contractures, No erythema


Integumentary: No rashes, No breakdown


Neurological: Normal speech, Normal tone


Lymphatics: No axilla or inguinal lymphadenopathy





- Studies


Laboratory Data (last 24 hrs)





06/16/20 09:15: WBC 15.3 H D, Hgb 12.3 L, Hct 36.1 L D, Plt Count 389  D


06/16/20 09:15: Sodium 133 L, Potassium 2.9 L*, BUN 19 H, Creatinine 0.76, 

Glucose 127 H, Total Bilirubin 0.7, AST 18, ALT 21, Alkaline Phosphatase 80, 

Lipase 4331 H








<Choco Mackey - Last Filed: 06/16/20 12:50>





- Physical Exam


Cardiovascular: Regular rate/rhythm


Gastrointestinal: Other (Patient with tenderness to the epigastric region)





- Studies


Laboratory Data (last 24 hrs)





06/16/20 09:15: WBC 15.3 H D, Hgb 12.3 L, Hct 36.1 L D, Plt Count 389  D


06/16/20 09:15: Sodium 133 L, Potassium 2.9 L*, BUN 19 H, Creatinine 0.76, 

Glucose 127 H, Total Bilirubin 0.7, AST 18, ALT 21, Alkaline Phosphatase 80, 

Lipase 4331 H








<Sohail Patton - Last Filed: 06/16/20 15:43>





Assessment and Plan





- Plan


Assessment:


Acute on chronic alcoholic pancreatitis


Hypertension


COPD


GERD


History of stroke


Tobacco abuse


Alcohol abuse





Plan:


Acute on chronic alcoholic pancreatitis:  The patient will remain NPO at this 

time and advance diet slowly as tolerated.  Gastroenterology has been consulted 

on this case.  Will provide IV fluids, anti-emetics, and pain medications as 

needed.  Will obtain repeat lipase level tomorrow in the morning.  DVT prophy

laxis with Lovenox 40 mg subcu once daily.  Appreciate further input from 

gastroenterology regarding the management this patient.  Anticipate clinical 

improvement next 24-48 hr at which time the patient be discharged home.  The 

patient will also receive a PPI for history of GERD.


Hypokalemia:  Electrolyte protocol in place, will recheck tomorrow.


Hypertension:  Will obtain and continue patient's home medications.


COPD:  Will provide albuterol inhaler as needed.


GERD:  Will provide with PPI therapy during admission.


History of stroke:  Will continue patient's daily chewable aspirin and 

encouraged cessation of tobacco products.


Tobacco abuse:  Will encourage the cessation of tobacco products and provide 

medical therapy as necessary.


Alcohol abuse:  Patient states last drink was a couple of weeks ago, will inform

patient of the importance of complete cessation of all alcohol products in 

regard to his chronic pancreatitis.


Discharge Plan: Home


Plan to discharge in: 48 Hours





- Advance Directives


Does patient have a Living Will: Yes


Does patient have a Durable POA for Healthcare: Yes





- Code Status/Comfort Care


Code Status Assessed: Yes (Patient is full code)


Critical Care: No


Time Spent Managing Pts Care (In Minutes): 55





<Choco Mackey - Last Filed: 06/16/20 12:50>





- Plan





Patient seen and examined.  Case discussed in detail with nurse practitioner.  

Agree with evaluation, assessment, and plan of care.  Patient with acute on 

chronic recurrent alcoholic pancreatitis.  Continue with IV fluids, antiemetics 

and pain control.  Will also discuss case with GI.  Will provide medication for 

hypertension, COPD, GERD.  Encourage tobacco and alcohol cessation.  Anticipate 

improvement over the next 48-72 hr.  Will need to arrange for close follow-up as

an outpatient with a PCP.





<Sohail Patton - Last Filed: 06/16/20 15:43>

## 2020-06-16 NOTE — ER
Nurse's Notes                                                                                     

 [facility]                                                                 

Name: Dandy Garnica                                                                                  

Age: 64 yrs                                                                                       

Sex: Male                                                                                         

: 1955                                                                                   

MRN: [id number]                                                                                   

Arrival Date: 2020                                                                          

Time: 08:50                                                                                       

Account#: [id number]                                                                            

Bed 15                                                                                            

Private MD:                                                                                       

Diagnosis: Acute pancreatitis                                                                     

                                                                                                  

Presentation:                                                                                     

                                                                                             

08:57 Chief complaint: N/V and lower abdominal pain x 3 days. Recently inpatient for          hb  

      pancreatitis. Not tolerating fluids. Coronavirus screen: Proceed with normal triage.        

      [place] Screen: No symptoms or risks identified at this time. Initial Sepsis Screen: Does     

      the patient meet any 2 criteria? HR > 90 bpm. No. Patient's initial sepsis screen is        

      negative. Does the patient have a suspected source of infection? No. Patient's initial      

      sepsis screen is negative. Risk Assessment: Do you want to hurt yourself or someone         

      else? Patient reports no desire to harm self or others. Onset of symptoms was 2020.                                                                                       

08:57 Method Of Arrival: Ambulatory                                                             

08:57 Acuity: LISA 2                                                                           hb  

                                                                                                  

Historical:                                                                                       

- Allergies:                                                                                      

09:01 No Known Allergies;                                                                     hb  

- PMHx:                                                                                           

09:01 Back pain; COPD; Hypertension; Pancreatitis;                                            hb  

                                                                                                  

- Immunization history:: Adult Immunizations up to date.                                          

- Social history:: Smoking status: Patient reports the use of cigarette tobacco                   

  products, smokes one-half pack cigarettes per day.                                              

                                                                                                  

                                                                                                  

Screenin:20 Abuse screen: Denies threats or abuse. Denies injuries from another. Nutritional        sv  

      screening: No deficits noted. Tuberculosis screening: No symptoms or risk factors           

      identified. Fall Risk None identified.                                                      

                                                                                                  

Assessment:                                                                                       

09:15 General: Appears in no apparent distress. uncomfortable, well developed, Behavior is    sv  

      calm, cooperative, appropriate for age. Pain: Complains of pain in left lower quadrant      

      and right lower quadrant Pain currently is 8 out of 10 on a pain scale. Quality of pain     

      is described as sharp, Is continuous. Neuro: Level of Consciousness is awake, alert,        

      obeys commands, Oriented to person, place, time, situation, Moves all extremities. Full     

      function Speech is normal. Respiratory: Airway is patent Respiratory effort is even,        

      unlabored, Respiratory pattern is regular, symmetrical. GI: Abdomen is flat, Reports        

      lower abdominal pain, nausea, vomiting. Derm: Skin is intact, Skin is pink, warm \T\ dry.   

11:00 Reassessment: Patient appears in no apparent distress at this time. Patient and/or      sv  

      family updated on plan of care and expected duration. Pain level reassessed. Patient is     

      alert, oriented x 3, equal unlabored respirations, skin warm/dry/pink.                      

12:10 Reassessment: Choco NP at bedside.                                                        sv  

12:23 Reassessment: Patient appears in no apparent distress at this time. Patient and/or      sv  

      family updated on plan of care and expected duration. Pain level reassessed. Patient is     

      alert, oriented x 3, equal unlabored respirations, skin warm/dry/pink.                      

14:25 Reassessment: Patient appears in no apparent distress at this time. Patient and/or      sv  

      family updated on plan of care and expected duration. Pain level reassessed. Patient is     

      alert, oriented x 3, equal unlabored respirations, skin warm/dry/pink.                      

                                                                                                  

Vital Signs:                                                                                      

08:57 BP 91 / 69; Pulse 116; Resp 18; Temp 98.4; Pulse Ox 97% on R/A; Weight 61.23 kg; Height hb  

      5 ft. 10 in. (177.80 cm); Pain 8/10;                                                        

09:42  / 78; Pulse 97; Resp 16; Pulse Ox 100% ;                                         sv  

08:57 Body Mass Index 19.37 (61.23 kg, 177.80 cm)                                             hb  

                                                                                                  

ED Course:                                                                                        

08:50 Patient arrived in ED.                                                                  fj 

08:58 Keon Starks PA is PHCP.                                                              [facility] 

08:58 Dino Medley MD is Attending Physician.                                             [facility] 

09:00 Triage completed.                                                                       hb  

09:01 Arm band placed on.                                                                     hb  

09:05 Jackie Becerra, RN is Primary Nurse.                                                  sv  

09:20 Patient has correct armband on for positive identification. Bed in low position. Call   sv  

      light in reach. Pulse ox on. NIBP on. Door closed. Warm blanket given. Head of bed          

      elevated.                                                                                   

09:20 Inserted saline lock: 20 gauge in right forearm, using aseptic technique. Blood         sv  

      collected. Flushed right forearm with 5 ml normal saline.                                   

11:39 US Abdomen Limited In Process Unspecified.                                              EDMS

11:39 Sohail Patton DO is Hospitalizing Provider.                                           [facility] 

14:25 No provider procedures requiring assistance completed. Patient admitted, IV remains in  sv  

      place. intact.                                                                              

                                                                                                  

Administered Medications:                                                                         

09:20 Drug: Lactated Ringers Solution 1000 ml Route: IV; Rate: 1000 bolus; Site: right        sv  

      forearm;                                                                                    

10:00 Follow up: Response: No adverse reaction; IV Status: Completed infusion; IV Intake:     sv  

      1000ml                                                                                      

09:20 Drug: Zofran (Ondansetron) 4 mg Route: IVP; Site: right forearm;                        sv  

10:00 Follow up: Response: No adverse reaction; Marked relief of symptoms                     sv  

                                                                                                  

                                                                                                  

Intake:                                                                                           

10:00 IV: 1000ml; Total: 1000ml.                                                              sv  

                                                                                                  

Outcome:                                                                                          

11:39 Decision to Hospitalize by Provider.                                                    colleen 

14:25 Patient left the ED.                                                                    kody  

14:25 Admitted to Med/surg accompanied by tech, via wheelchair, with chart, Other Report      sv  

      called by Laura CRAVEN                                                                        

14:25 Condition: stable                                                                           

14:25 Instructed on the need for admit.                                                           

                                                                                                  

Signatures:                                                                                       

Dispatcher [employer]                           Jackie Jorge, RN                    RN                                                      

Keon Starks PA PA jmm Baxter, Heather, Lucio Harrington RN                                 fj                                                  

                                                                                                  

**************************************************************************************************

## 2020-06-16 NOTE — RAD REPORT
EXAM DESCRIPTION:  RAD - Chest Pa And Lat (2 Views) - 6/16/2020 4:25 pm

 

CLINICAL HISTORY:  Evaluate for pneumonia, Increased WBC

Chest pain.

 

COMPARISON:  Chest Pa And Lat (2 Views) dated 3/10/2019; Chest Single View dated 3/6/2019

 

FINDINGS:  Prominent emphysema is present. No focal infiltrate seen. The heart is normal in size. No 
displaced fractures.

 

IMPRESSION:  Prominent COPD.

## 2020-06-16 NOTE — XMS REPORT
Continuity of Care Document

                            Created on:2020



Patient:MELVI FIGUEROA

Sex:Male

:1955

External Reference #:[id number]





Demographics







                          Address                   [address]



                                                    [place] [address]

 

                          Home Phone                [phone]

 

                          Preferred Language        English

 

                          Marital Status            Unknown

 

                          Catholic Affiliation     Unknown

 

                          Race                      Unknown

 

                          Additional Race(s)        Unavailable

 

                          Ethnic Group              Unknown









Author







                          Organization              [facility]

 

                          Address                   [address] [place] [address] [address]. Watts [address] [place] [id number]

 

                          Phone                     [phone]









Care Team Providers







                    Name                Role                Phone

 

                    Unavailable         Unavailable         Unavailable









Problems

This patient has no known problems.



Allergies, Adverse Reactions, Alerts

This patient has no known allergies or adverse reactions.



Medications

This patient has no known medications.



Procedures

This patient has no known procedures.



Results

This patient has no known results.

## 2020-06-17 LAB
ALBUMIN SERPL BCP-MCNC: 2.2 G/DL (ref 3.4–5)
ALP SERPL-CCNC: 57 U/L (ref 45–117)
ALT SERPL W P-5'-P-CCNC: 14 U/L (ref 12–78)
AST SERPL W P-5'-P-CCNC: 13 U/L (ref 15–37)
BUN BLD-MCNC: 12 MG/DL (ref 7–18)
GLUCOSE SERPLBLD-MCNC: 82 MG/DL (ref 74–106)
HCT VFR BLD CALC: 30.4 % (ref 39.6–49)
HDLC SERPL-MCNC: 32 MG/DL (ref 40–60)
LDLC SERPL CALC-MCNC: 80 MG/DL (ref ?–130)
LIPASE SERPL-CCNC: 2481 U/L (ref 73–393)
LYMPHOCYTES # SPEC AUTO: 1.6 K/UL (ref 0.7–4.9)
MAGNESIUM SERPL-MCNC: 1.7 MG/DL (ref 1.8–2.4)
PMV BLD: 8.2 FL (ref 7.6–11.3)
POTASSIUM SERPL-SCNC: 3.8 MMOL/L (ref 3.5–5.1)
RBC # BLD: 3.16 M/UL (ref 4.33–5.43)

## 2020-06-17 RX ADMIN — Medication SCH ML: at 19:56

## 2020-06-17 RX ADMIN — MOMETASONE FUROATE AND FORMOTEROL FUMARATE DIHYDRATE SCH PUFF: 100; 5 AEROSOL RESPIRATORY (INHALATION) at 19:56

## 2020-06-17 RX ADMIN — MORPHINE SULFATE PRN MG: 2 INJECTION, SOLUTION INTRAMUSCULAR; INTRAVENOUS at 12:00

## 2020-06-17 RX ADMIN — SODIUM CHLORIDE SCH MG: 0.9 INJECTION, SOLUTION INTRAVENOUS at 07:43

## 2020-06-17 RX ADMIN — SODIUM CHLORIDE SCH MLS: 0.9 INJECTION, SOLUTION INTRAVENOUS at 23:43

## 2020-06-17 RX ADMIN — MORPHINE SULFATE PRN MG: 2 INJECTION, SOLUTION INTRAMUSCULAR; INTRAVENOUS at 03:58

## 2020-06-17 RX ADMIN — MORPHINE SULFATE PRN MG: 2 INJECTION, SOLUTION INTRAMUSCULAR; INTRAVENOUS at 19:57

## 2020-06-17 RX ADMIN — ENOXAPARIN SODIUM SCH MG: 40 INJECTION SUBCUTANEOUS at 07:52

## 2020-06-17 RX ADMIN — ASPIRIN SCH MG: 81 TABLET, COATED ORAL at 07:52

## 2020-06-17 RX ADMIN — SODIUM CHLORIDE SCH MLS: 0.9 INJECTION, SOLUTION INTRAVENOUS at 14:05

## 2020-06-17 RX ADMIN — Medication SCH ML: at 07:44

## 2020-06-17 RX ADMIN — NICOTINE SCH MG: 21 PATCH TRANSDERMAL at 07:43

## 2020-06-17 RX ADMIN — MOMETASONE FUROATE AND FORMOTEROL FUMARATE DIHYDRATE SCH PUFF: 100; 5 AEROSOL RESPIRATORY (INHALATION) at 09:27

## 2020-06-17 RX ADMIN — SODIUM CHLORIDE SCH: 0.9 INJECTION, SOLUTION INTRAVENOUS at 20:27

## 2020-06-17 RX ADMIN — MORPHINE SULFATE PRN MG: 2 INJECTION, SOLUTION INTRAMUSCULAR; INTRAVENOUS at 16:13

## 2020-06-17 RX ADMIN — MORPHINE SULFATE PRN MG: 2 INJECTION, SOLUTION INTRAMUSCULAR; INTRAVENOUS at 07:52

## 2020-06-17 RX ADMIN — SODIUM CHLORIDE SCH MG: 0.9 INJECTION, SOLUTION INTRAVENOUS at 19:56

## 2020-06-17 NOTE — P.PN
Subjective


Date of Service: 06/17/20


Primary Care Provider: none


Chief Complaint: Acute on chronic pancreatitis


Subjective: No C/O voiced





Review of Systems


General: Unremarkable


Eyes: Unremarkable


ENT: Unremarkable


Respiratory: Unremarkable


Cardiovascular: Unremarkable


Gastrointestinal: Nausea, Abdominal Pain, As per HPI


Genitourinary: Unremarkable


Musculoskeletal: Unremarkable


Integumentary: Unremarkable


Neurological: Unremarkable


Lymphatics: Unremarkable





Physical Examination





- Vital Signs


Temperature: 97 F


Blood Pressure: 157/72


Pulse: 85


Respirations: 20


Pulse Ox (%): 93





- Physical Exam


General: Alert, In no apparent distress, Oriented x3


HEENT: Atraumatic, Normocephalic


Neck: Supple


Respiratory: Clear to auscultation bilaterally, Normal air movement


Cardiovascular: No edema, Normal pulses, Regular rate/rhythm


Capillary refill: <2 Seconds


Gastrointestinal: Normal bowel sounds, Tenderness (Moderate abdominal tenderness

in the upper quadrants)


Musculoskeletal: No clubbing, No erythema, No tenderness, No warmth


Integumentary: No breakdown, No significant lesion


Neurological: Normal speech, Normal tone, Normal affect





- Studies


Laboratory Data (last 24 hrs)





06/17/20 05:34: Sodium 135 L, Potassium 3.8, BUN 12, Creatinine 0.39 L, Glucose 

82, Magnesium 1.7 L, Total Bilirubin 0.5, AST 13 L, ALT 14, Alkaline Phosphatase

57, Triglycerides 107, Cholesterol 133, HDL Cholesterol 32 L, Cholesterol/HDL 

Ratio 4.16, Lipase 2481 H


06/17/20 05:34: WBC 9.9  D, Hgb 10.3 L, Hct 30.4 L D, Plt Count 295  D


06/17/20 00:36: Potassium 3.7


06/16/20 : Potassium Cancelled





Microbiology Data (last 24 hrs): 








06/16/20 16:00   Nasopharnyx   Coronavirus COVID-19 PCR - Final








Assessment & Plan


Discharge Plan: Home


Plan to discharge in: 48 Hours





- Code Status/Comfort Care


Code Status Assessed: Yes (Patient is full code)


Physician Review Additional Text: 





Assessment:


Acute on chronic alcoholic pancreatitis


Hypertension


COPD


GERD


History of stroke


Tobacco abuse


Alcohol abuse





Plan:


Acute on chronic alcoholic pancreatitis:  The patient is still NPO at this time,

still having some abdominal pain and nausea.  Will reassess in the morning to 

determine if patient is ready to trial clear liquids.  Gastroenterology has been

consulted on this case.  Will provide IV fluids, anti-emetics, and pain 

medications as needed.  Repeat lipase has dropped considerably but is still 

elevated at around 2000, will repeat in the morning.  DVT prophylaxis with 

Lovenox 40 mg subcu once daily.  Appreciate further input from gastroenterology 

regarding the management this patient.  Anticipate clinical improvement next 24-

48 hr at which time the patient be discharged home.  The patient will also 

receive a PPI for history of GERD.


Hypokalemia:  Electrolyte protocol in place will continue to monitor


Hypertension:  Will obtain and continue patient's home medications.


COPD:  Will provide albuterol inhaler as needed.


GERD:  Will provide with PPI therapy during admission.


History of stroke:  Will continue patient's daily chewable aspirin and 

encouraged cessation of tobacco products.


Tobacco abuse:  Will encourage the cessation of tobacco products and provide 

medical therapy as necessary.


Alcohol abuse:  Patient states last drink was a couple of weeks ago, will inform

patient of the importance of complete cessation of all alcohol products in 

regard to his chronic pancreatitis.


Critical Care: No


Time Spent Managing Pts Care (In Minutes): 55

## 2020-06-18 LAB
ALBUMIN SERPL BCP-MCNC: 2.2 G/DL (ref 3.4–5)
ALP SERPL-CCNC: 54 U/L (ref 45–117)
ALT SERPL W P-5'-P-CCNC: 13 U/L (ref 12–78)
AST SERPL W P-5'-P-CCNC: 14 U/L (ref 15–37)
BUN BLD-MCNC: 8 MG/DL (ref 7–18)
GLUCOSE SERPLBLD-MCNC: 108 MG/DL (ref 74–106)
HCT VFR BLD CALC: 32.2 % (ref 39.6–49)
LIPASE SERPL-CCNC: 2182 U/L (ref 73–393)
LYMPHOCYTES # SPEC AUTO: 1.2 K/UL (ref 0.7–4.9)
MAGNESIUM SERPL-MCNC: 1.7 MG/DL (ref 1.8–2.4)
PMV BLD: 8.4 FL (ref 7.6–11.3)
POTASSIUM SERPL-SCNC: 3.7 MMOL/L (ref 3.5–5.1)
RBC # BLD: 3.36 M/UL (ref 4.33–5.43)

## 2020-06-18 RX ADMIN — MORPHINE SULFATE PRN MG: 2 INJECTION, SOLUTION INTRAMUSCULAR; INTRAVENOUS at 20:13

## 2020-06-18 RX ADMIN — MORPHINE SULFATE PRN MG: 2 INJECTION, SOLUTION INTRAMUSCULAR; INTRAVENOUS at 04:12

## 2020-06-18 RX ADMIN — ASPIRIN SCH MG: 81 TABLET, COATED ORAL at 08:28

## 2020-06-18 RX ADMIN — NICOTINE SCH MG: 21 PATCH TRANSDERMAL at 08:27

## 2020-06-18 RX ADMIN — Medication SCH ML: at 20:14

## 2020-06-18 RX ADMIN — SODIUM CHLORIDE SCH MLS: 0.9 INJECTION, SOLUTION INTRAVENOUS at 09:50

## 2020-06-18 RX ADMIN — SODIUM CHLORIDE SCH MG: 0.9 INJECTION, SOLUTION INTRAVENOUS at 08:27

## 2020-06-18 RX ADMIN — MORPHINE SULFATE PRN MG: 2 INJECTION, SOLUTION INTRAMUSCULAR; INTRAVENOUS at 00:06

## 2020-06-18 RX ADMIN — MORPHINE SULFATE PRN MG: 2 INJECTION, SOLUTION INTRAMUSCULAR; INTRAVENOUS at 16:17

## 2020-06-18 RX ADMIN — MORPHINE SULFATE PRN MG: 2 INJECTION, SOLUTION INTRAMUSCULAR; INTRAVENOUS at 12:43

## 2020-06-18 RX ADMIN — Medication SCH ML: at 08:29

## 2020-06-18 RX ADMIN — MOMETASONE FUROATE AND FORMOTEROL FUMARATE DIHYDRATE SCH PUFF: 100; 5 AEROSOL RESPIRATORY (INHALATION) at 20:13

## 2020-06-18 RX ADMIN — SODIUM CHLORIDE SCH MLS: 0.9 INJECTION, SOLUTION INTRAVENOUS at 19:05

## 2020-06-18 RX ADMIN — SODIUM CHLORIDE SCH MG: 0.9 INJECTION, SOLUTION INTRAVENOUS at 20:13

## 2020-06-18 RX ADMIN — MOMETASONE FUROATE AND FORMOTEROL FUMARATE DIHYDRATE SCH PUFF: 100; 5 AEROSOL RESPIRATORY (INHALATION) at 08:28

## 2020-06-18 RX ADMIN — MORPHINE SULFATE PRN MG: 2 INJECTION, SOLUTION INTRAMUSCULAR; INTRAVENOUS at 08:27

## 2020-06-18 RX ADMIN — ENOXAPARIN SODIUM SCH MG: 40 INJECTION SUBCUTANEOUS at 08:27

## 2020-06-18 NOTE — P.PN
Subjective


Date of Service: 06/18/20


Primary Care Provider: none


Chief Complaint: Acute on chronic pancreatitis


Subjective: No new changes, Tolerating diet, Improving





Review of Systems


General: Unremarkable


Eyes: Unremarkable


ENT: Unremarkable


Respiratory: Unremarkable


Cardiovascular: Unremarkable


Gastrointestinal: Nausea, Abdominal Pain


Genitourinary: Unremarkable


Musculoskeletal: Unremarkable


Integumentary: Unremarkable


Neurological: Unremarkable





Physical Examination





- Vital Signs


Temperature: 97.4 F


Blood Pressure: 132/70


Pulse: 97


Respirations: 18


Pulse Ox (%): 94





- Physical Exam


General: Alert, In no apparent distress, Oriented x3


HEENT: Atraumatic, Normocephalic


Neck: Supple


Respiratory: Clear to auscultation bilaterally, Normal air movement


Cardiovascular: No edema, Regular rate/rhythm, Normal S1 S2


Capillary refill: <2 Seconds


Gastrointestinal: Normal bowel sounds, Tenderness (Mild epigastric tenderness)


Musculoskeletal: No erythema, No tenderness


Integumentary: No erythema, No warmth


Neurological: Normal speech, Normal tone, Normal affect


Lymphatics: No axilla or inguinal lymphadenopathy





- Studies


Microbiology Data (last 24 hrs): 








06/16/20 16:00   Nasopharnyx   Coronavirus COVID-19 PCR - Final








Assessment & Plan


Discharge Plan: Home


Plan to discharge in: 48 Hours





- Code Status/Comfort Care


Code Status Assessed: Yes (Patient is full code)


Physician Review Additional Text: 





Assessment:


Acute on chronic alcoholic pancreatitis


Hypertension


COPD


GERD


History of stroke


Tobacco abuse


Alcohol abuse





Plan:


Acute on chronic alcoholic pancreatitis:  The patient is now tolerating a full 

liquid diet.  Patient is still having some abdominal pain although has improved 

yesterday.  Patient is not having any vomiting.  Lipase is still in the 2000 

range, will continue to monitor patient overnight.  Will repeat lipase level in 

the morning.  Anticipate clinical improvement next 24-48 hr.  Appreciate further

input from gastroenterology.


Hypokalemia:  Electrolyte protocol in place will continue to monitor


Hypertension:  Will continue patient's home medications.  Will continue to 

monitor patient's blood pressure.


COPD:  Will provide albuterol inhaler as needed.


GERD:  Will continue PPI therapy.


History of stroke:  Will continue patient's daily chewable aspirin and 

encouraged cessation of tobacco products.


Tobacco abuse:  Will encourage the cessation of tobacco products and provide 

medical therapy as necessary.


Alcohol abuse:  Patient states last drink was a couple of weeks ago, will inform

patient of the importance of complete cessation of all alcohol products in 

regard to his chronic pancreatitis.


Critical Care: No


Time Spent Managing Pts Care (In Minutes): 55

## 2020-06-19 LAB
BUN BLD-MCNC: 3 MG/DL (ref 7–18)
GLUCOSE SERPLBLD-MCNC: 106 MG/DL (ref 74–106)
HCT VFR BLD CALC: 31.1 % (ref 39.6–49)
LIPASE SERPL-CCNC: 1759 U/L (ref 73–393)
LYMPHOCYTES # SPEC AUTO: 1 K/UL (ref 0.7–4.9)
MAGNESIUM SERPL-MCNC: 1.7 MG/DL (ref 1.8–2.4)
PMV BLD: 8.4 FL (ref 7.6–11.3)
POTASSIUM SERPL-SCNC: 3.6 MMOL/L (ref 3.5–5.1)
RBC # BLD: 3.21 M/UL (ref 4.33–5.43)

## 2020-06-19 RX ADMIN — SODIUM CHLORIDE SCH MLS: 0.9 INJECTION, SOLUTION INTRAVENOUS at 04:25

## 2020-06-19 RX ADMIN — MORPHINE SULFATE PRN MG: 2 INJECTION, SOLUTION INTRAMUSCULAR; INTRAVENOUS at 00:04

## 2020-06-19 RX ADMIN — METOPROLOL TARTRATE SCH: 50 TABLET, FILM COATED ORAL at 08:30

## 2020-06-19 RX ADMIN — ENOXAPARIN SODIUM SCH MG: 40 INJECTION SUBCUTANEOUS at 08:26

## 2020-06-19 RX ADMIN — SODIUM CHLORIDE SCH MG: 0.9 INJECTION, SOLUTION INTRAVENOUS at 20:27

## 2020-06-19 RX ADMIN — Medication SCH ML: at 20:29

## 2020-06-19 RX ADMIN — SODIUM CHLORIDE SCH: 0.9 INJECTION, SOLUTION INTRAVENOUS at 22:27

## 2020-06-19 RX ADMIN — NICOTINE SCH MG: 21 PATCH TRANSDERMAL at 08:27

## 2020-06-19 RX ADMIN — MORPHINE SULFATE PRN MG: 2 INJECTION, SOLUTION INTRAMUSCULAR; INTRAVENOUS at 08:26

## 2020-06-19 RX ADMIN — MORPHINE SULFATE PRN MG: 2 INJECTION, SOLUTION INTRAMUSCULAR; INTRAVENOUS at 20:26

## 2020-06-19 RX ADMIN — MOMETASONE FUROATE AND FORMOTEROL FUMARATE DIHYDRATE SCH PUFF: 100; 5 AEROSOL RESPIRATORY (INHALATION) at 20:28

## 2020-06-19 RX ADMIN — MORPHINE SULFATE PRN MG: 2 INJECTION, SOLUTION INTRAMUSCULAR; INTRAVENOUS at 04:20

## 2020-06-19 RX ADMIN — SODIUM CHLORIDE SCH: 0.9 INJECTION, SOLUTION INTRAVENOUS at 08:40

## 2020-06-19 RX ADMIN — METOPROLOL TARTRATE SCH MG: 50 TABLET, FILM COATED ORAL at 20:28

## 2020-06-19 RX ADMIN — MORPHINE SULFATE PRN MG: 2 INJECTION, SOLUTION INTRAMUSCULAR; INTRAVENOUS at 12:21

## 2020-06-19 RX ADMIN — Medication SCH ML: at 08:29

## 2020-06-19 RX ADMIN — SODIUM CHLORIDE SCH MG: 0.9 INJECTION, SOLUTION INTRAVENOUS at 08:26

## 2020-06-19 RX ADMIN — ASPIRIN SCH MG: 81 TABLET, COATED ORAL at 08:27

## 2020-06-19 RX ADMIN — MOMETASONE FUROATE AND FORMOTEROL FUMARATE DIHYDRATE SCH PUFF: 100; 5 AEROSOL RESPIRATORY (INHALATION) at 08:30

## 2020-06-19 RX ADMIN — MORPHINE SULFATE PRN MG: 2 INJECTION, SOLUTION INTRAMUSCULAR; INTRAVENOUS at 16:33

## 2020-06-19 RX ADMIN — METOPROLOL TARTRATE SCH MG: 50 TABLET, FILM COATED ORAL at 06:38

## 2020-06-19 NOTE — P.PN
Subjective


Date of Service: 06/19/20


Primary Care Provider: none


Chief Complaint: Acute on chronic pancreatitis


Subjective: No new changes, Tolerating diet, Ambulating, Improving





Review of Systems


General: Unremarkable


Eyes: Unremarkable


ENT: Unremarkable


Respiratory: Unremarkable


Cardiovascular: Unremarkable


Gastrointestinal: Abdominal Pain


Genitourinary: Unremarkable


Musculoskeletal: Unremarkable


Integumentary: Unremarkable


Neurological: Unremarkable





Physical Examination





- Vital Signs


Temperature: 98.3 F


Blood Pressure: 147/70


Pulse: 77


Respirations: 16


Pulse Ox (%): 96





- Physical Exam


General: Alert, In no apparent distress, Oriented x3


HEENT: Atraumatic, Normocephalic


Neck: Supple


Respiratory: Clear to auscultation bilaterally, Normal air movement


Cardiovascular: No edema, Regular rate/rhythm, Normal S1 S2


Gastrointestinal: Normal bowel sounds, Soft and benign, Tenderness (Mild 

abdominal tenderness)


Musculoskeletal: No clubbing, No swelling, No contractures


Integumentary: No rashes, No breakdown


Neurological: Normal gait, Normal speech, Normal tone


Lymphatics: No axilla or inguinal lymphadenopathy





Assessment & Plan


Discharge Plan: Home


Plan to discharge in: 24 Hours





- Code Status/Comfort Care


Code Status Assessed: Yes (Patient is full code)


Physician Review Additional Text: 





Assessment:


Acute on chronic alcoholic pancreatitis


Hypertension


COPD


GERD


History of stroke


Tobacco abuse


Alcohol abuse





Plan:


Acute on chronic alcoholic pancreatitis:  The patient is now tolerating a full 

liquid diet.  Patient is still having some abdominal pain although has improved 

yesterday.  Patient is not having any vomiting.  Lipase is now around 1700, will

continue to monitor patient overnight.  I have added oral pain medications for 

prolonged duration of action.  Will repeat lipase level in the morning.  

Anticipate clinical improvement next 24-48 hr.  Appreciate further input from 

gastroenterology.


Hypokalemia:  Electrolyte protocol in place will continue to monitor


Hypertension:  Will continue patient's home medications.  Will continue to 

monitor patient's blood pressure.


COPD:  Will provide albuterol inhaler as needed.


GERD:  Will continue PPI therapy.


History of stroke:  Will continue patient's daily chewable aspirin and encourag

ed cessation of tobacco products.


Tobacco abuse:  Will encourage the cessation of tobacco products and provide 

medical therapy as necessary.


Alcohol abuse:  Patient states last drink was a couple of weeks ago, will inform

patient of the importance of complete cessation of all alcohol products in 

regard to his chronic pancreatitis.


Critical Care: No


Time Spent Managing Pts Care (In Minutes): 55

## 2020-06-20 LAB
BUN BLD-MCNC: 3 MG/DL (ref 7–18)
GLUCOSE SERPLBLD-MCNC: 99 MG/DL (ref 74–106)
HCT VFR BLD CALC: 28.4 % (ref 39.6–49)
LIPASE SERPL-CCNC: 774 U/L (ref 73–393)
LYMPHOCYTES # SPEC AUTO: 1 K/UL (ref 0.7–4.9)
MAGNESIUM SERPL-MCNC: 1.7 MG/DL (ref 1.8–2.4)
PMV BLD: 8.7 FL (ref 7.6–11.3)
POTASSIUM SERPL-SCNC: 3.6 MMOL/L (ref 3.5–5.1)
RBC # BLD: 2.93 M/UL (ref 4.33–5.43)

## 2020-06-20 RX ADMIN — SODIUM CHLORIDE SCH MLS: 0.9 INJECTION, SOLUTION INTRAVENOUS at 03:07

## 2020-06-20 RX ADMIN — ASPIRIN SCH MG: 81 TABLET, COATED ORAL at 08:12

## 2020-06-20 RX ADMIN — HYDROCODONE BITARTRATE AND ACETAMINOPHEN PRN TAB: 7.5; 325 TABLET ORAL at 08:14

## 2020-06-20 RX ADMIN — Medication SCH ML: at 20:31

## 2020-06-20 RX ADMIN — SODIUM CHLORIDE SCH MG: 0.9 INJECTION, SOLUTION INTRAVENOUS at 20:31

## 2020-06-20 RX ADMIN — TRAMADOL HYDROCHLORIDE PRN MG: 50 TABLET, COATED ORAL at 13:53

## 2020-06-20 RX ADMIN — HYDROCODONE BITARTRATE AND ACETAMINOPHEN PRN TAB: 7.5; 325 TABLET ORAL at 20:34

## 2020-06-20 RX ADMIN — SODIUM CHLORIDE SCH MG: 0.9 INJECTION, SOLUTION INTRAVENOUS at 08:12

## 2020-06-20 RX ADMIN — SODIUM CHLORIDE SCH MLS: 0.9 INJECTION, SOLUTION INTRAVENOUS at 13:53

## 2020-06-20 RX ADMIN — METOPROLOL TARTRATE SCH MG: 50 TABLET, FILM COATED ORAL at 08:14

## 2020-06-20 RX ADMIN — MORPHINE SULFATE PRN MG: 2 INJECTION, SOLUTION INTRAMUSCULAR; INTRAVENOUS at 00:34

## 2020-06-20 RX ADMIN — MOMETASONE FUROATE AND FORMOTEROL FUMARATE DIHYDRATE SCH PUFF: 100; 5 AEROSOL RESPIRATORY (INHALATION) at 20:32

## 2020-06-20 RX ADMIN — MOMETASONE FUROATE AND FORMOTEROL FUMARATE DIHYDRATE SCH PUFF: 100; 5 AEROSOL RESPIRATORY (INHALATION) at 08:21

## 2020-06-20 RX ADMIN — MORPHINE SULFATE PRN MG: 2 INJECTION, SOLUTION INTRAMUSCULAR; INTRAVENOUS at 04:48

## 2020-06-20 RX ADMIN — ENOXAPARIN SODIUM SCH MG: 40 INJECTION SUBCUTANEOUS at 08:21

## 2020-06-20 RX ADMIN — Medication SCH ML: at 08:12

## 2020-06-20 RX ADMIN — NICOTINE SCH MG: 21 PATCH TRANSDERMAL at 08:12

## 2020-06-20 RX ADMIN — METOPROLOL TARTRATE SCH: 50 TABLET, FILM COATED ORAL at 20:32

## 2020-06-20 NOTE — P.PN
Subjective


Date of Service: 06/20/20


Primary Care Provider: none


Chief Complaint: Acute on chronic pancreatitis


Subjective: Improving, Doing well





Physical Examination





- Vital Signs


Temperature: 98.2 F


Blood Pressure: 129/68


Pulse: 71


Respirations: 16


Pulse Ox (%): 95





- Physical Exam


General: Alert, In no apparent distress, Oriented x3, Cooperative


HEENT: Atraumatic


Neck: Supple


Respiratory: Clear to auscultation bilaterally, Normal air movement


Cardiovascular: Normal pulses, Regular rate/rhythm


Gastrointestinal: Other (Pain to the abdomen improved)


Integumentary: No tenderness/swelling, No erythema, No warmth, No cyanosis


Neurological: Normal speech, Normal strength at 5/5 x4 extr, Normal tone, Normal

affect





- Studies


Medications List Reviewed: Yes





Assessment & Plan


Discharge Plan: Home


Physician Review Additional Text: 





Assessment:


Acute on chronic alcoholic pancreatitis


Hypertension


COPD


GERD


History of stroke


Tobacco abuse


Alcohol abuse





Plan:


Acute on chronic alcoholic pancreatitis:  Patient continues to improve.  Will 

advance diet to GI soft.  Still with mild pain. Continue monitor over the next 

24 hr.  If stable will plan to discharge tomorrow morning .


Hypokalemia:  Electrolyte protocol in place will continue to monitor


Hypertension:  Continue home medication.


COPD:  Continue medication.


GERD:  Will continue PPI therapy.


History of stroke:  Will continue patient's daily chewable aspirin and 

encouraged cessation of tobacco products.


Tobacco abuse:  Continue to provide education on cessation of tobacco products 

and provide medical therapy as necessary.


Alcohol abuse:  Continue to readdress alcohol cessation.  Patient agrees.


Time Spent Managing Pts Care (In Minutes): 55

## 2020-06-21 LAB
BUN BLD-MCNC: 2 MG/DL (ref 7–18)
GLUCOSE SERPLBLD-MCNC: 115 MG/DL (ref 74–106)
HCT VFR BLD CALC: 30.2 % (ref 39.6–49)
LIPASE SERPL-CCNC: 1941 U/L (ref 73–393)
LYMPHOCYTES # SPEC AUTO: 1.3 K/UL (ref 0.7–4.9)
MAGNESIUM SERPL-MCNC: 1.8 MG/DL (ref 1.8–2.4)
PMV BLD: 8.8 FL (ref 7.6–11.3)
POTASSIUM SERPL-SCNC: 3.4 MMOL/L (ref 3.5–5.1)
RBC # BLD: 3.17 M/UL (ref 4.33–5.43)

## 2020-06-21 RX ADMIN — METOPROLOL TARTRATE SCH: 50 TABLET, FILM COATED ORAL at 08:13

## 2020-06-21 RX ADMIN — MOMETASONE FUROATE AND FORMOTEROL FUMARATE DIHYDRATE SCH PUFF: 100; 5 AEROSOL RESPIRATORY (INHALATION) at 20:45

## 2020-06-21 RX ADMIN — SODIUM CHLORIDE SCH MLS: 0.9 INJECTION, SOLUTION INTRAVENOUS at 01:45

## 2020-06-21 RX ADMIN — HYDROCODONE BITARTRATE AND ACETAMINOPHEN PRN TAB: 7.5; 325 TABLET ORAL at 02:44

## 2020-06-21 RX ADMIN — MOMETASONE FUROATE AND FORMOTEROL FUMARATE DIHYDRATE SCH PUFF: 100; 5 AEROSOL RESPIRATORY (INHALATION) at 08:54

## 2020-06-21 RX ADMIN — Medication SCH: at 08:14

## 2020-06-21 RX ADMIN — SODIUM CHLORIDE SCH: 0.9 INJECTION, SOLUTION INTRAVENOUS at 04:27

## 2020-06-21 RX ADMIN — ENOXAPARIN SODIUM SCH MG: 40 INJECTION SUBCUTANEOUS at 08:13

## 2020-06-21 RX ADMIN — METOPROLOL TARTRATE SCH: 50 TABLET, FILM COATED ORAL at 20:45

## 2020-06-21 RX ADMIN — SODIUM CHLORIDE, SODIUM LACTATE, POTASSIUM CHLORIDE, AND CALCIUM CHLORIDE SCH MLS: .6; .31; .03; .02 INJECTION, SOLUTION INTRAVENOUS at 17:48

## 2020-06-21 RX ADMIN — SODIUM CHLORIDE SCH MG: 0.9 INJECTION, SOLUTION INTRAVENOUS at 08:13

## 2020-06-21 RX ADMIN — HYDROCODONE BITARTRATE AND ACETAMINOPHEN PRN TAB: 7.5; 325 TABLET ORAL at 08:52

## 2020-06-21 RX ADMIN — HYDROCODONE BITARTRATE AND ACETAMINOPHEN PRN TAB: 7.5; 325 TABLET ORAL at 20:44

## 2020-06-21 RX ADMIN — NICOTINE SCH MG: 21 PATCH TRANSDERMAL at 08:13

## 2020-06-21 RX ADMIN — SODIUM CHLORIDE SCH MG: 0.9 INJECTION, SOLUTION INTRAVENOUS at 20:44

## 2020-06-21 RX ADMIN — SODIUM CHLORIDE, SODIUM LACTATE, POTASSIUM CHLORIDE, AND CALCIUM CHLORIDE SCH MLS: .6; .31; .03; .02 INJECTION, SOLUTION INTRAVENOUS at 09:53

## 2020-06-21 RX ADMIN — ASPIRIN SCH MG: 81 TABLET, COATED ORAL at 08:13

## 2020-06-21 RX ADMIN — HYDROCODONE BITARTRATE AND ACETAMINOPHEN PRN TAB: 7.5; 325 TABLET ORAL at 14:56

## 2020-06-21 RX ADMIN — Medication SCH: at 20:45

## 2020-06-21 NOTE — P.PN
Subjective


Date of Service: 06/21/20


Primary Care Provider: none


Chief Complaint: Acute on chronic pancreatitis


Subjective: Other (Uptrend in lipase level, from 770 to 1900.  Dsicharge held.  

Will downgrade diet to CLD.  Patient is asympatomatic.)





Physical Examination





- Vital Signs


Temperature: 98.1 F


Blood Pressure: 129/71


Pulse: 79


Respirations: 15


Pulse Ox (%): 95





- Physical Exam


General: Alert, In no apparent distress, Cooperative


HEENT: Atraumatic, Normocephalic


Neck: Supple


Respiratory: Clear to auscultation bilaterally, Normal air movement


Cardiovascular: No edema, Normal pulses, Regular rate/rhythm, Normal S1 S2


Gastrointestinal: Normal bowel sounds, Soft and benign, Non-distended


Musculoskeletal: No clubbing, No swelling, No contractures, No erythema, No 

tenderness, No warmth


Integumentary: No rashes, No breakdown, No significant lesion, No 

tenderness/swelling, No erythema, No warmth, No cyanosis


Neurological: Normal speech, Sensation intact, Normal affect





- Studies


Medications List Reviewed: Yes





Assessment & Plan





- Problems (Diagnosis)


(1) Acute on chronic pancreatitis


Current Visit: Yes   Status: Acute   





(2) Pancreatic pseudocyst


Current Visit: No   Status: Acute   


Physician Review Additional Text: 





Assessment:


Patient is a 64 year old  male with a PMH of chronic alcoholism 

currently admitted with acute on chronic pancreatitis.  Patient has done well 

since admission until 6/21 when he was noticed to  have a uptrend in lipase 

level.  He is asymptomatic.  





Acute on chronic alcoholic pancreatitis


Hypertension


COPD


GERD


History of stroke


Tobacco abuse


Alcohol abuse





Plan:


De-escalate diet to clear liquid diet


Start LR infusion at 150 cc/hr


Replace electrolyte as needed


Continue pantoprazole





Chronic issues:


Hypertension:  Continue home medication.


COPD:  Continue medication.


History of stroke:  Will continue patient's daily chewable aspirin and 

encouraged cessation of tobacco products.


Tobacco abuse:  Continue to provide education on cessation of tobacco products 

and provide medical therapy as necessary.


Alcohol abuse:  Continue to readdress alcohol cessation.  Patient agrees.

## 2020-06-22 VITALS — SYSTOLIC BLOOD PRESSURE: 121 MMHG | DIASTOLIC BLOOD PRESSURE: 56 MMHG

## 2020-06-22 VITALS — OXYGEN SATURATION: 96 %

## 2020-06-22 VITALS — TEMPERATURE: 98 F

## 2020-06-22 LAB
BUN BLD-MCNC: 3 MG/DL (ref 7–18)
GLUCOSE SERPLBLD-MCNC: 95 MG/DL (ref 74–106)
MAGNESIUM SERPL-MCNC: 1.6 MG/DL (ref 1.8–2.4)
POTASSIUM SERPL-SCNC: 4.6 MMOL/L (ref 3.5–5.1)

## 2020-06-22 RX ADMIN — NICOTINE SCH MG: 21 PATCH TRANSDERMAL at 08:20

## 2020-06-22 RX ADMIN — MOMETASONE FUROATE AND FORMOTEROL FUMARATE DIHYDRATE SCH PUFF: 100; 5 AEROSOL RESPIRATORY (INHALATION) at 08:24

## 2020-06-22 RX ADMIN — SODIUM CHLORIDE SCH MG: 0.9 INJECTION, SOLUTION INTRAVENOUS at 08:19

## 2020-06-22 RX ADMIN — SODIUM CHLORIDE, SODIUM LACTATE, POTASSIUM CHLORIDE, AND CALCIUM CHLORIDE SCH MLS: .6; .31; .03; .02 INJECTION, SOLUTION INTRAVENOUS at 06:41

## 2020-06-22 RX ADMIN — Medication SCH: at 08:24

## 2020-06-22 RX ADMIN — METOPROLOL TARTRATE SCH MG: 50 TABLET, FILM COATED ORAL at 08:23

## 2020-06-22 RX ADMIN — HYDROCODONE BITARTRATE AND ACETAMINOPHEN PRN TAB: 7.5; 325 TABLET ORAL at 09:28

## 2020-06-22 RX ADMIN — ENOXAPARIN SODIUM SCH MG: 40 INJECTION SUBCUTANEOUS at 08:20

## 2020-06-22 RX ADMIN — HYDROCODONE BITARTRATE AND ACETAMINOPHEN PRN TAB: 7.5; 325 TABLET ORAL at 03:08

## 2020-06-22 RX ADMIN — TRAMADOL HYDROCHLORIDE PRN MG: 50 TABLET, COATED ORAL at 00:44

## 2020-06-22 RX ADMIN — SODIUM CHLORIDE, SODIUM LACTATE, POTASSIUM CHLORIDE, AND CALCIUM CHLORIDE SCH MLS: .6; .31; .03; .02 INJECTION, SOLUTION INTRAVENOUS at 00:44

## 2020-06-22 NOTE — P.DS
Admission Date: 06/17/20


Discharge Date: 06/22/20


Primary Care Provider: none


Disposition: ROUTINE DISCHARGE


Discharge Condition: GOOD


Reason for Admission: Acute on chronic pancreatitis


Consultations: 





none





Procedures: 





CT Scan 6/1/2020: 


FINDINGS:  Mild linear subsegmental atelectasis is present in both lung 

bases.Moderate axial hiatal hernia. 


Diffuse fatty liver is present. No intrahepatic biliary dilatation. 


Moderate inflammatory changes are present involving the pancreas. 3 cm 

pseudocyst is seen in the region of the pancreatic neck. Dystrophic 

calcifications are also present in the pancreas with mild pancreatic ductal 

dilatation. 


The spleen, adrenal glands and kidneys within normal limits. Mild free fluid is 

seen in the abdomen and pelvis. No free air. 


No bowel obstruction or abscess.  The appendix is normal.  No evidence of 

significant lymphadenopathy. 


No suspicious bony findings. 


IMPRESSION:  Moderate acute pancreatitis superimposed on chronic pancreatitis. 


3 cm pseudocyst in the region of the pancreatic neck.





Abdominal ultrasound 06/16/2020:


COMPARISON:  Abdomen   Pelvis W Contrast dated 6/1/2020 


FINDINGS:  No gallstones, sludge or other abnormalties within the gallbladder 

lumen. There is no wall thickening or pericholecystic fluid. Trace amount of 

ascites seen in the right upper quadrant. 


No common duct stone or biliary tree dilatation identified. 


Liver and pancreas are not assessed on this study. 


IMPRESSION:  Normal gallbladder and biliary tree ultrasound.





Medical Problem List: 


Acute on chronic alcoholic pancreatitis with history of 3 cm pseudocyst in the 

region of the pancreatic neck


Hypertension


COPD


GERD


History of stroke


Tobacco abuse


Alcohol abuse





Brief History of Present Illness: 





Patient seen and examined.  Reviewed history along with patient.  Agree with 

evaluation by nurse practitioner.  Patient admits drinking last week.  Patient 

understands that he needs to quit due to recurrent pancreatitis.


Hospital Course: 





Patient presented with acute on chronic recurrent alcoholic pancreatitis.  

Patient was initially treated with IV fluids and pain control.  His symptoms 

continued to improve daily.  Recent CT scan showed a 3 cm pancreatic neck pseud

ocyst.  Lipase is continue to improve but slightly fluctuated up near discharge.

At discharge he is without nausea, vomiting or significant abdominal pain. 

Patient tolerating full liquid diet at this time.  Patient desires to go home.  

Patient understands risks.  Patient will be discharged home.  Strict alcohol 

cessation addressed in detail.  Patient understands the risk of continued 

alcohol abuse.  Patient plans to quit.  At discharge continued tobacco and 

alcohol cessation.  Patient will be provided nicotine patch daily.  Patient will

be provided tramadol 50 mg 1 pill 3 times a day as needed for pain. Recommend 

follow up with GI as an outpatient to further address and monitor.





Patient with hypertension.  Patient not previously on medication.  Patient also 

with history of CVA.  Patient started on metoprolol.  Blood pressure better 

controlled.  At discharge he will continue with aspirin 81 mg daily and 

metoprolol 50 mg 1 pill twice daily.  Recommend to maintain blood pressure less 

150/80.  Further adjustment can be done by his PCP.  Patient plans to establish 

care.





Patient with GERD.  At discharge patient will continue with Pepcid 20 mg 1 pill 

twice daily.  GERD education provided.  Recommend follow up with GI as an 

outpatient to further address.





Patient with underlying COPD and tobacco abuse.  Tobacco cessation addressed in 

detail.  At discharge he will continue with nicotine patch daily.  Patient will 

also be provided Dulera 2 puffs twice daily and Pro air 2 puffs 3 times a day as

needed for shortness of breath.  Education on COPD and medication provided.  

Recommend follow up with PCP to further monitor and treat.





As mentioned above patient with alcohol abuse.  Alcohol cessation addressed in 

detail.  Risks address in detail.  Patient understands.  Patient plans to quit.





Patient also mentioned a history of chronic back pain. Patient reports that he 

is to follow up with neuro surgery-Dr. Gómez to further address.  Patient may 

continue with tramadol as directed.  Other consideration is starting gabapentin 

100 mg 3 times a day as needed for pain. Pain medication will be provided.


Vital Signs/Physical Exam: 














Temp Pulse Resp BP Pulse Ox


 


 98 F   72   16   164/73 H  99 


 


 06/22/20 08:00  06/22/20 08:23  06/22/20 10:40  06/22/20 08:23  06/22/20 10:40








General: Alert, In no apparent distress, Oriented x3


HEENT: Atraumatic


Neck: Supple


Respiratory: Clear to auscultation bilaterally, Normal air movement


Cardiovascular: Normal pulses, Regular rate/rhythm


Gastrointestinal: Normal bowel sounds, Soft and benign, Non-distended


Musculoskeletal: No erythema, No tenderness, No warmth


Integumentary: No tenderness/swelling, No erythema, No warmth, No cyanosis


Neurological: Normal speech, Normal strength at 5/5 x4 extr, Normal tone, Normal

affect


Laboratory Data at Discharge: 














WBC  6.8 K/uL (4.3-10.9)  D 06/21/20  05:26    


 


Hgb  10.2 g/dL (13.6-17.9)  L  06/21/20  05:26    


 


Hct  30.2 % (39.6-49.0)  L  06/21/20  05:26    


 


Plt Count  319 K/uL (152-406)   06/21/20  05:26    


 


Sodium  139 mmol/L (136-145)   06/22/20  05:24    


 


Potassium  4.6 mmol/L (3.5-5.1)   06/22/20  05:24    


 


BUN  3 mg/dL (7-18)  L  06/22/20  05:24    


 


Creatinine  0.54 mg/dL (0.55-1.3)  L  06/22/20  05:24    


 


Glucose  95 mg/dL ()   06/22/20  05:24    


 


Magnesium  1.6 mg/dL (1.8-2.4)  L  06/22/20  05:24    


 


Total Bilirubin  0.5 mg/dL (0.2-1.0)   06/18/20  04:58    


 


AST  14 U/L (15-37)  L  06/18/20  04:58    


 


ALT  13 U/L (12-78)   06/18/20  04:58    


 


Alkaline Phosphatase  54 U/L ()   06/18/20  04:58    


 


Triglycerides  107 mg/dL (<150)   06/17/20  05:34    


 


Cholesterol  133 mg/dL (<200)   06/17/20  05:34    


 


HDL Cholesterol  32 mg/dL (40-60)  L  06/17/20  05:34    


 


Cholesterol/HDL Ratio  4.16   06/17/20  05:34    


 


Lipase  2231 U/L ()  H  06/22/20  08:33    








Home Medications: 








Aspirin Chewable [Aspirin Chewable*] 1 tab PO DAILY 03/06/19 


Cholecalciferol (Vitamin D3) [Vitamin D3] 1 tab PO DAILY 03/06/19 


Acetaminophen [Tylenol Extra Strength] 1,000 mg PO BID PRN 06/16/20 


Albuterol Inhaler [Ventolin Inhaler*] 2 puff IH Q6H PRN #1 hfa.aer.ad 06/22/20 


Famotidine [Pepcid] 20 mg PO BID #60 tab 06/22/20 


Gabapentin [Neurontin] 100 mg PO TID PRN #30 cap 06/22/20 


Metoprolol Tartrate [Lopressor*] 50 mg PO BID #60 tab 06/22/20 


Mometasone/Formoterol [Dulera 100 Mcg/5 Mcg Inhaler] 2 puff IH BID #1 inhaler 

06/22/20 


Nicotine [Nicoderm*] 21 mg TD DAILY #30 patch.td24 06/22/20 


Thiamine HCl 100 mg PO DAILY #90 tablet 06/22/20 


traMADol HCL [Ultram*] 50 mg PO TID PRN #15 tab 06/22/20 





New Medications: 


Mometasone/Formoterol [Dulera 100 Mcg/5 Mcg Inhaler] 2 puff IH BID #1 inhaler


Metoprolol Tartrate [Lopressor*] 50 mg PO BID #60 tab


Gabapentin [Neurontin] 100 mg PO TID PRN #30 cap


 PRN Reason: Pain Scale 2-4 (Mild)


Nicotine [Nicoderm*] 21 mg TD DAILY #30 patch.td24


Famotidine [Pepcid] 20 mg PO BID #60 tab


Thiamine HCl 100 mg PO DAILY #90 tablet


traMADol HCL [Ultram*] 50 mg PO TID PRN #15 tab


 PRN Reason: Pain Scale 5-7 (Moderate)


Albuterol Inhaler [Ventolin Inhaler*] 2 puff IH Q6H PRN #1 hfa.aer.ad


 PRN Reason: Shortness Of Breath


Patient Discharge Instructions: 1.  Recommend follow up with PCP to establish 

care and follow up this hospitalization.  2.  Patient presented with acute on 

chronic recurrent alcoholic pancreatitis.  Patient was initially treated with IV

fluids and pain control.  His symptoms continued to improve daily.  Recent CT 

scan showed a 3 cm pancreatic neck pseudocyst.  Lipase is continue to improve 

but slightly fluctuated up near discharge. At discharge he is without nausea, 

vomiting or significant abdominal pain. Patient tolerating full liquid diet at 

this time.  Patient desires to go home.  Patient understands risks.  Patient 

will be discharged home.  Strict alcohol cessation addressed in detail.  Patient

understands the risk of continued alcohol abuse.  Patient plans to quit.  At 

discharge continued tobacco and alcohol cessation.  Patient will be provided 

nicotine patch daily.  Patient will be provided tramadol 50 mg 1 pill 3 times a 

day as needed for pain. Recommend follow up with GI as an outpatient to further 

address and monitor.  3.  Patient with hypertension.  Patient not previously on 

medication.  Patient also with history of CVA.  Patient started on metoprolol.  

Blood pressure better controlled.  At discharge he will continue with aspirin 81

mg daily and metoprolol 50 mg 1 pill twice daily.  Recommend to maintain blood 

pressure less 150/80.  Further adjustment can be done by his PCP.  Patient plans

to establish care.  4.  Patient with GERD.  At discharge patient will continue 

with Pepcid 20 mg 1 pill twice daily.  GERD education provided.  Recommend 

follow up with GI as an outpatient to further address.  5.  Patient with 

underlying COPD and tobacco abuse.  Tobacco cessation addressed in detail.  At 

discharge he will continue with nicotine patch daily.  Patient will also be 

provided Dulera 2 puffs twice daily and Pro air 2 puffs 3 times a day as needed 

for shortness of breath.  Education on COPD and medication provided.  Recommend 

follow up with PCP to further monitor and treat.  6.  As mentioned above patient

with alcohol abuse.  Alcohol cessation addressed in detail.  Risks address in 

detail.  Patient understands.  Patient plans to quit.  7.  Patient also 

mentioned a history of chronic back pain. Patient reports that he is to follow 

up with neuro surgery-Dr. Gómez to further address.  Patient may continue with 

tramadol as directed.  Other consideration is starting gabapentin 100 mg 3 times

a day as needed for pain. Pain medication will be provided.


Diet: Full liquid then advance to a GI heart healthy diet as tolerated


Activity: Ad mike


Time spent managing pt's care (in minutes): 55

## 2021-03-21 ENCOUNTER — HOSPITAL ENCOUNTER (EMERGENCY)
Dept: HOSPITAL 97 - ER | Age: 66
Discharge: HOME | End: 2021-03-21
Payer: COMMERCIAL

## 2021-03-21 VITALS — OXYGEN SATURATION: 96 % | SYSTOLIC BLOOD PRESSURE: 117 MMHG | DIASTOLIC BLOOD PRESSURE: 75 MMHG

## 2021-03-21 VITALS — TEMPERATURE: 98 F

## 2021-03-21 DIAGNOSIS — I10: ICD-10-CM

## 2021-03-21 DIAGNOSIS — Z88.1: ICD-10-CM
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
## 2021-03-21 DIAGNOSIS — S50.812A: Primary | ICD-10-CM

## 2021-03-21 DIAGNOSIS — F10.129: ICD-10-CM
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
## 2021-03-21 DIAGNOSIS — Z23: ICD-10-CM
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
## 2021-03-21 LAB
ALBUMIN SERPL BCP-MCNC: 3.7 G/DL (ref 3.4–5)
ALP SERPL-CCNC: 122 U/L (ref 45–117)
ALT SERPL W P-5'-P-CCNC: 26 U/L (ref 12–78)
AST SERPL W P-5'-P-CCNC: 22 U/L (ref 15–37)
BUN BLD-MCNC: 6 MG/DL (ref 7–18)
GLUCOSE SERPLBLD-MCNC: 98 MG/DL (ref 74–106)
HCT VFR BLD CALC: 38.5 % (ref 39.6–49)
INR BLD: 0.97
LYMPHOCYTES # SPEC AUTO: 2.4 K/UL (ref 0.7–4.9)
PMV BLD: 7.9 FL (ref 7.6–11.3)
POTASSIUM SERPL-SCNC: 3.5 MMOL/L (ref 3.5–5.1)
RBC # BLD: 4.43 M/UL (ref 4.33–5.43)
TROPONIN (EMERG DEPT USE ONLY): < 0.02 NG/ML (ref 0–0.04)

## 2021-03-21 PROCEDURE — 84484 ASSAY OF TROPONIN QUANT: CPT

## 2021-03-21 PROCEDURE — 99284 EMERGENCY DEPT VISIT MOD MDM: CPT

## 2021-03-21 PROCEDURE — 80076 HEPATIC FUNCTION PANEL: CPT

## 2021-03-21 PROCEDURE — 85025 COMPLETE CBC W/AUTO DIFF WBC: CPT

## 2021-03-21 PROCEDURE — 80320 DRUG SCREEN QUANTALCOHOLS: CPT

## 2021-03-21 PROCEDURE — 93005 ELECTROCARDIOGRAM TRACING: CPT

## 2021-03-21 PROCEDURE — 70450 CT HEAD/BRAIN W/O DYE: CPT

## 2021-03-21 PROCEDURE — 85610 PROTHROMBIN TIME: CPT

## 2021-03-21 PROCEDURE — 36415 COLL VENOUS BLD VENIPUNCTURE: CPT

## 2021-03-21 PROCEDURE — 71045 X-RAY EXAM CHEST 1 VIEW: CPT

## 2021-03-21 PROCEDURE — 80329 ANALGESICS NON-OPIOID 1 OR 2: CPT

## 2021-03-21 PROCEDURE — 80048 BASIC METABOLIC PNL TOTAL CA: CPT

## 2021-03-21 PROCEDURE — 90471 IMMUNIZATION ADMIN: CPT

## 2021-03-21 PROCEDURE — 90714 TD VACC NO PRESV 7 YRS+ IM: CPT

## 2021-03-21 PROCEDURE — 85730 THROMBOPLASTIN TIME PARTIAL: CPT

## 2021-03-21 NOTE — EDPHYS
Physician Documentation                                                                           

 [facility]                                                                 

Name: Dandy Garnica                                                                                  

Age: 65 yrs                                                                                       

Sex: Male                                                                                         

: 1955                                                                                   

MRN: [id number]                                                                                   

Arrival Date: 2021                                                                          

Time: 00:40                                                                                       

Account#: [id number]                                                                            

Bed 5                                                                                             

Private MD:                                                                                       

ED Physician Chu Landaverde                                                                    

HPI:                                                                                              

                                                                                             

01:43 This 65 yrs old  Male presents to ER via Wheelchair with complaints of Fall.   mh7 

01:43 Details of fall: The patient fell from an upright position, while walking. Onset: The   mh7 

      symptoms/episode began/occurred today. Associated injuries: The patient sustained left      

      forearm, abrasion. Severity of symptoms: At their worst the symptoms were mild, earlier     

      today, in the emergency department the symptoms are unchanged. Patient has been             

      drinking alcohol this evening then fell when trying to get into his home. He has injury     

      to his left arm. He is not sure if he hit his head but does not believe he had LOC.         

      Denies headache, chest pain, SOB, abdominal pain, nausea, vomiting, dizziness,              

      numbness/tingling, or weakness..                                                            

                                                                                                  

Historical:                                                                                       

- Allergies:                                                                                      

00:40 TETRACYCLINES;                                                                          rr5 

- PMHx:                                                                                           

00:40 Back pain; COPD; Hypertension; Pancreatitis;                                            rr5 

                                                                                                  

- Immunization history:: Adult Immunizations unknown.                                             

- Social history:: Smoking status: Patient reports the use of cigarette tobacco                   

  products, smokes one pack cigarettes per day. Patient uses alcohol, on a daily basis.           

                                                                                                  

                                                                                                  

ROS:                                                                                              

02:05 Constitutional: Negative for fever, chills, and weight loss, Eyes: Negative for injury, mh7 

      pain, redness, and discharge, ENT: Negative for injury, pain, and discharge, Neck:          

      Negative for injury, pain, and swelling, Cardiovascular: Negative for chest pain,           

      palpitations, and edema, Respiratory: Negative for shortness of breath, cough,              

      wheezing, and pleuritic chest pain, Abdomen/GI: Negative for abdominal pain, nausea,        

      vomiting, diarrhea, and constipation, Back: Negative for injury and pain, : Negative      

      for injury, bleeding, discharge, and swelling, Neuro: Negative for headache, weakness,      

      numbness, tingling, and seizure, Psych: Negative for depression, anxiety, suicide           

      ideation, homicidal ideation, and hallucinations, Allergy/Immunology: Negative for          

      hives, rash, and allergies, Endocrine: Negative for neck swelling, polydipsia,              

      polyuria, polyphagia, and marked weight changes, Hematologic/Lymphatic: Negative for        

      swollen nodes, abnormal bleeding, and unusual bruising.                                     

                                                                                                  

Exam:                                                                                             

02:05 Head/Face:  Normocephalic, atraumatic. Eyes:  Pupils equal round and reactive to light, mh7 

      extra-ocular motions intact.  Lids and lashes normal.  Conjunctiva and sclera are           

      non-icteric and not injected.  Cornea within normal limits.  Periorbital areas with no      

      swelling, redness, or edema. Neck:  Trachea midline, no thyromegaly or masses palpated,     

      and no cervical lymphadenopathy.  Supple, full range of motion without nuchal rigidity,     

      or vertebral point tenderness.  No Meningismus. Chest/axilla:  Normal chest wall            

      appearance and motion.  Nontender with no deformity.  No lesions are appreciated.           

      Cardiovascular:  Regular rate and rhythm with a normal S1 and S2.  No gallops, murmurs,     

      or rubs.  Normal PMI, no JVD.  No pulse deficits. Respiratory:  Lungs have equal breath     

      sounds bilaterally, clear to auscultation and percussion.  No rales, rhonchi or wheezes     

      noted.  No increased work of breathing, no retractions or nasal flaring. Abdomen/GI:        

      Soft, non-tender, with normal bowel sounds.  No distension or tympany.  No guarding or      

      rebound.  No evidence of tenderness throughout. Back:  No spinal tenderness.  No            

      costovertebral tenderness.  Full range of motion.                                           

02:05 Neuro:  Awake and alert, GCS 15, oriented to person, place, time, and situation.            

      Cranial nerves II-XII grossly intact.  Motor strength 5/5 in all extremities.  Sensory      

      grossly intact.  Cerebellar exam normal.  Normal gait. Psych:  Awake, alert, with           

      orientation to person, place and time.  Behavior, mood, and affect are within normal        

      limits.                                                                                     

02:05 Constitutional: The patient appears in no acute distress, alert, awake, smells of           

      alcohol, ETOH, Appears intoxicated                                                          

02:05 Musculoskeletal/extremity: Extremities: noted in the left forearm: abrasion,                

      tenderness, ROM: intact in all extremities, Circulation is intact in all extremities.       

      Pulses: are normal with no appreciated deficits, Sensation intact. Compartment Syndrome     

      exam of affected extremity: is normal. no numbness, no tingling, no sensation deficit,      

      no palor, no weak pulses, Joints: All joints appear normal with full range of motion.       

02:05 Skin: injury, abrasion(s), small abrasion noted, of the left forearm.                       

                                                                                                  

Vital Signs:                                                                                      

00:40  / 95; Pulse 82; Resp 17; Temp 98; Pulse Ox 99% ; Weight 61.23 kg; Height 5 ft.   rr5 

      10 in. (177.80 cm);                                                                         

02:56  / 67; Pulse 80; Resp 18; Pulse Ox 100% on R/A;                                   mg2 

04:19  / 66; Pulse 73; Resp 18; Pulse Ox 98% on R/A;                                    ea  

05:05  / 69; Pulse 75; Resp 16; Pulse Ox 98% ;                                          rr5 

06:30  / 65; Pulse 70; Resp 16; Pulse Ox 98% ;                                          rr5 

08:31  / 83; Pulse 69; Resp 16 S; Pulse Ox 98% on R/A;                                  jd3 

09:40  / 75; Pulse 98; Resp 16; Temp 98; Pulse Ox 96% ;                                 bp  

00:40 Body Mass Index 19.37 (61.23 kg, 177.80 cm)                                             rr5 

                                                                                                  

MDM:                                                                                              

06:21 Differential diagnosis: abrasion, closed head injury, contusion, fracture, laceration.  [facility] 

      Data reviewed: vital signs, nurses notes, lab test result(s), CBC, electrolytes, EKG,       

      radiologic studies, CT scan. Data interpreted: Pulse oximetry: on room air is 98 %.         

      Interpretation: normal. Counseling: I had a detailed discussion with the patient and/or     

      guardian regarding: the historical points, exam findings, and any diagnostic results        

      supporting the discharge/admit diagnosis, lab results, radiology results, the need for      

      outpatient follow up, to return to the emergency department if symptoms worsen or           

      persist or if there are any questions or concerns that arise at home. Response to           

      treatment: the patient's symptoms have markedly improved after treatment.                   

06:21 Refusal of service: The patient/guardian displays adequate decision making capability   [facility] 

      and despite a detailed discussion of alternatives, benefits, risks, and consequences        

      refuses: all X-rays.                                                                        

06:23 Patient medically screened.                                                             [facility] 

                                                                                                  

                                                                                             

00:54 Order name: Acetaminophen                                                               [facility] 

                                                                                             

00:54 Order name: Basic Metabolic Panel                                                       [facility] 

                                                                                             

00:54 Order name: CBC with Diff                                                               [facility] 

                                                                                             

00:54 Order name: ETOH Level; Complete Time: 02:31                                            [facility] 

                                                                                             

00:54 Order name: Hepatic Function; Complete Time: 02:                                      [facility] 

                                                                                             

00:54 Order name: PT-INR; Complete Time: :                                                [facility] 

                                                                                             

00:54 Order name: Ptt, Activated; Complete Time: 02:                                        [facility] 

                                                                                             

00:54 Order name: Salicylate; Complete Time: 02:                                            [facility] 

                                                                                             

00:54 Order name: Troponin (emerg Dept Use Only); Complete Time: 02:                        [facility] 

                                                                                             

00:54 Order name: CT Head Brain wo Cont                                                       [facility] 

                                                                                             

00:55 Order name: Acetaminophen Level; Complete Time: 02:                                   [facility]

                                                                                             

00:55 Order name: Basic Metabolic Panel; Complete Time: :                                 [facility]

                                                                                             

00:55 Order name: CBC with Automated Diff; Complete Time: 02:                               [facility]

                                                                                             

00:54 Order name: EKG; Complete Time: 00:55                                                   [facility] 

                                                                                             

00:54 Order name: EKG - Nurse/Tech; Complete Time: 01:                                      [facility] 

                                                                                             

00:54 Order name: IV Saline Lock; Complete Time: 01:                                        [facility] 

                                                                                             

00:54 Order name: Labs collected and sent; Complete Time: :                               [facility] 

                                                                                             

00:54 Order name: Chest Single View XRAY                                                      [facility] 

                                                                                                  

Administered Medications:                                                                         

02:58 Drug: Tetanus-Diphtheria Toxoid Adult 0.5 ml {: [employer]. Exp:         mg2 

      2023. Lot #: [id number]. } Route: IM; Site: left deltoid;                                  

04:00 Follow up: Response: No adverse reaction                                                rr5 

02:58 CANCELLED (Duplicate Order): Tetanus-Diphtheria Toxoid Adult 0.5 ml IM once             mg2 

                                                                                                  

                                                                                                  

Disposition:                                                                                      

21 06:23 Discharged to Home. Impression: Alcohol Intoxication, Fall, Abrasion, left         

  forearm.                                                                                        

- Condition is Stable.                                                                            

- Discharge Instructions: Alcohol Intoxication, Easy-to-Read, Abrasion, Easy-to-Read.             

                                                                                                  

- Medication Reconciliation Form, Thank You Letter, Antibiotic Education, Prescription            

  Opioid Use form.                                                                                

- Follow up: Private Physician; When: 1 - 2 days; Reason: Worsening of condition,                 

  Recheck today's complaints, Continuance of care, Re-evaluation by your physician.               

- Problem is new.                                                                                 

- Symptoms have improved.                                                                         

                                                                                                  

                                                                                                  

                                                                                                  

Signatures:                                                                                       

Dispatcher [employer]                           EDMS                                                 

Rose Pearson, RN                      Ge Ivey ea RN                    RN   jd3                                                  

Adiel Wong, RN                    RN   mg2                                                  

Javi Zelaya RN                      RN   rr5                                                  

Fransico Pink MD MD   7                                                  

                                                                                                  

Corrections: (The following items were deleted from the chart)                                    

01:17 00:56 Forearm Left+RAD.RAD.BRZ ordered. EDMS                                            EDMS

01:17 00:56 Wrist Left 3 View+RAD.RAD.BRZ ordered. EDMS                                       EDMS

01:17 00:56 Hand Left 3 View+RAD.RAD.BRZ ordered. EDMS                                        EDMS

02:58 02:57 Tetanus-Diphtheria Toxoid Adult 0.5 ml IM once ordered. mg2                       mg2 

09:41 06:23 2021 06:23 Discharged to Home. Impression: Alcohol Intoxication; Fall;      jd3 

      Abrasion, left forearm. Condition is Stable. Forms are Medication Reconciliation Form,      

      Thank You Letter, Antibiotic Education, Prescription Opioid Use. Follow up: Private         

      Physician; When: 1 - 2 days; Reason: Worsening of condition, Recheck today's                

      complaints, Continuance of care, Re-evaluation by your physician. Problem is new.           

      Symptoms have improved. mh7                                                                 

                                                                                                  

**************************************************************************************************

## 2021-03-21 NOTE — XMS REPORT
Continuity of Care Document

                            Created on:2021



Patient:MELVI FIGUEROA

Sex:Male

:1955

External Reference #:[id number]





Demographics







                          Address                   [address]



                                                    [place] [address]

 

                          Home Phone                [phone]

 

                          Work Phone                [phone]

 

                          Email Address             NONE

 

                          Preferred Language        English

 

                          Marital Status            Unknown

 

                          [employer] Affiliation     Unknown

 

                          Race                      Unknown

 

                          Additional Race(s)        Unavailable

 

                          Ethnic Group              Unknown









Author







                          Organization              [facility]

t

 

                          Address                   [address] [place] [address] Dr. Watts [address] [place] [id number]

 

                          Phone                     [phone]









Care Team Providers







                    Name                Role                Phone

 

                    Unavailable         Unavailable         Unavailable









Problems

This patient has no known problems.



Allergies, Adverse Reactions, Alerts

This patient has no known allergies or adverse reactions.



Medications

This patient has no known medications.



Procedures

This patient has no known procedures.



Results

This patient has no known results.

## 2021-03-21 NOTE — RAD REPORT
EXAM DESCRIPTION:  Shilo Single View3/21/2021 1:19 am

 

CLINICAL HISTORY:  Cough

 

COMPARISON:  2020

 

FINDINGS:   The lungs appear clear of acute infiltrate. The heart is normal size

 

IMPRESSION:   No acute abnormalities displayed

## 2021-03-21 NOTE — ER
Nurse's Notes                                                                                     

 [facility]                                                                 

Name: Dandy Garnica                                                                                  

Age: 65 yrs                                                                                       

Sex: Male                                                                                         

: 1955                                                                                   

MRN: [id number]                                                                                   

Arrival Date: 2021                                                                          

Time: 00:40                                                                                       

Account#: [id number]                                                                            

Bed 5                                                                                             

Private MD:                                                                                       

Diagnosis: Alcohol Intoxication;Fall;Abrasion, left forearm                                       

                                                                                                  

Presentation:                                                                                     

                                                                                             

00:40 Chief complaint: Patient states: shortness of breath started tonight. had 4 cans of     rr5 

      beer. denies weakness, slurring speech or numbness.                                         

00:40 Coronavirus screen: Client denies travel out of the [place] in the last 14 days. At this   rr5 

      time, the client does not indicate any symptoms associated with coronavirus-19. Ebola       

      Screen: Patient negative for fever greater than or equal to 101.5 degrees Fahrenheit,       

      and additional compatible Ebola Virus Disease symptoms Patient denies exposure to           

      infectious person. Patient denies travel to an Ebola-affected area in the 21 days           

      before illness onset. Initial Sepsis Screen: Does the patient meet any 2 criteria? No.      

      Patient's initial sepsis screen is negative. Does the patient have a suspected source       

      of infection? No. Patient's initial sepsis screen is negative. Risk Assessment: Do you      

      want to hurt yourself or someone else? Patient reports no desire to harm self or            

      others. Onset of symptoms was 2021.                                               

00:40 Method Of Arrival: Wheelchair                                                           rr5 

00:40 Acuity: LISA 3                                                                           rr5 

                                                                                                  

Historical:                                                                                       

- Allergies:                                                                                      

00:40 TETRACYCLINES;                                                                          rr5 

- PMHx:                                                                                           

00:40 Back pain; COPD; Hypertension; Pancreatitis;                                            rr5 

                                                                                                  

- Immunization history:: Adult Immunizations unknown.                                             

- Social history:: Smoking status: Patient reports the use of cigarette tobacco                   

  products, smokes one pack cigarettes per day. Patient uses alcohol, on a daily basis.           

                                                                                                  

                                                                                                  

Screenin:40 Abuse screen: Denies threats or abuse. Denies injuries from another. VAN Screening: Arm rr5 

      Drift: Patient shows no arm weakness. Patient is VAN negative.                              

00:46 Nutritional screening: No deficits noted. Tuberculosis screening: No symptoms or risk   ea  

      factors identified. Fall Risk Fall in past 12 months (25 points).                           

                                                                                                  

Assessment:                                                                                       

01:22 General: Appears in no apparent distress. comfortable, Behavior is calm, cooperative,   mg2 

      patient is drunk, had 4 beers tonigt and he is a drinker. Pain:. Neuro: Level of            

      Consciousness is awake, alert, obeys commands, Oriented to person, place, time,             

      situation. Cardiovascular: Capillary refill < 3 seconds Patient's skin is warm and dry.     

      Respiratory: Airway is patent Respiratory effort is even, unlabored, Respiratory            

      pattern is regular, symmetrical. GI: No signs and/or symptoms were reported involving       

      the gastrointestinal system. : No signs and/or symptoms were reported regarding the       

      genitourinary system. EENT: No signs and/or symptoms were reported regarding the EENT       

      system. Derm: Wound noted left arm. Musculoskeletal: Circulation, motion, and sensation     

      intact. Capillary refill < 3 seconds.                                                       

02:56 Reassessment: Patient appears in no apparent distress at this time. Patient and/or      mg2 

      family updated on plan of care and expected duration. Pain level reassessed. Patient is     

      alert, oriented x 3, equal unlabored respirations, skin warm/dry/pink.                      

04:16 Reassessment: Patient and/or family updated on plan of care and expected duration. Pain ea  

      level reassessed. Pt resting with eyes closed, respirations even and unlabored, chest       

      expansions even and symmetrical. No s/s of pain or discomfort noted at this time.           

05:09 Reassessment: Patient appears in no apparent distress at this time. No changes from     rr5 

      previously documented assessment. in side lying position vital signs taken and recorded.    

06:36 Reassessment: Patient appears in no apparent distress at this time. Patient is alert,   rr5 

      oriented x 3, equal unlabored respirations, skin warm/dry/pink. discharge instruction       

      given and explained with out complaints made. called his  for his ride he          

      stated 30 minutes to come Patient states symptoms have improved.                            

07:30 Reassessment: Patient appears in no apparent distress at this time. No changes from     jd3 

      previously documented assessment. Patient and/or family updated on plan of care and         

      expected duration. Pain level reassessed. Patient is alert, oriented x 3, equal             

      unlabored respirations, skin warm/dry/pink. awaiting ride for discharge.                    

08:31 Reassessment: Patient appears in no apparent distress at this time. No changes from     jd3 

      previously documented assessment. Patient and/or family updated on plan of care and         

      expected duration. Pain level reassessed. Patient is alert, oriented x 3, equal             

      unlabored respirations, skin warm/dry/pink. awaiting ride for discharge.                    

09:28 Reassessment: Patient appears in no apparent distress at this time. Patient and/or      jd3 

      family updated on plan of care and expected duration. Pain level reassessed. Patient is     

      alert, oriented x 3, equal unlabored respirations, skin warm/dry/pink. pt to front of       

      ER to wait for ride that pt reported was on the way now. even and steady gait upon          

      discharge.                                                                                  

                                                                                                  

Vital Signs:                                                                                      

00:40  / 95; Pulse 82; Resp 17; Temp 98; Pulse Ox 99% ; Weight 61.23 kg; Height 5 ft.   rr5 

      10 in. (177.80 cm);                                                                         

02:56  / 67; Pulse 80; Resp 18; Pulse Ox 100% on R/A;                                   mg2 

04:19  / 66; Pulse 73; Resp 18; Pulse Ox 98% on R/A;                                    ea  

05:05  / 69; Pulse 75; Resp 16; Pulse Ox 98% ;                                          rr5 

06:30  / 65; Pulse 70; Resp 16; Pulse Ox 98% ;                                          rr5 

08:31  / 83; Pulse 69; Resp 16 S; Pulse Ox 98% on R/A;                                  jd3 

09:40  / 75; Pulse 98; Resp 16; Temp 98; Pulse Ox 96% ;                                 bp  

00:40 Body Mass Index 19.37 (61.23 kg, 177.80 cm)                                             rr5 

                                                                                                  

ED Course:                                                                                        

00:40 Patient arrived in ED.                                                                  cf2 

00:42 Fransico Pink MD is Attending Physician.                                             7 

00:45 Rose Pearson RN is Primary Nurse.                                                    ea  

00:46 Triage completed.                                                                       rr5 

00:46 Patient has correct armband on for positive identification. Placed in gown. Bed in low  ea  

      position. Call light in reach. Side rails up X2.                                            

00:46 Arm band placed on right wrist. Patient placed in an exam room, on a stretcher, on      ea  

      pulse oximetry.                                                                             

01:19 Chest Single View XRAY In Process Unspecified.                                          EDMS

01:23 No provider procedures requiring assistance completed. Inserted saline lock: 20 gauge   mg2 

      in right antecubital area, using aseptic technique. Blood collected.                        

01:45 CT Head Brain wo Cont In Process Unspecified.                                           EDMS

02:57 Wound care: to abrasion, located on left arm was cleaned with Betadine, Patient         mg2 

      tolerated.                                                                                  

08:51 Attending Physician role handed off by Fransico Pink MD                              ma2 

08:51 Chu Landaverde MD is Attending Physician.                                           ma2 

09:39 IV discontinued, intact, bleeding controlled, No redness/swelling at site. Pressure     jd3 

      dressing applied.                                                                           

                                                                                                  

Administered Medications:                                                                         

02:58 Drug: Tetanus-Diphtheria Toxoid Adult 0.5 ml {: [employer]. Exp:         mg2 

      2023. Lot #: [id number]. } Route: IM; Site: left deltoid;                                  

04:00 Follow up: Response: No adverse reaction                                                rr5 

02:58 CANCELLED (Duplicate Order): Tetanus-Diphtheria Toxoid Adult 0.5 ml IM once             mg2 

                                                                                                  

                                                                                                  

Outcome:                                                                                          

06:23 Discharge ordered by MD.                                                                7 

09:39 Discharged to home ambulatory, with family.                                             jd3 

09:39 Condition: stable                                                                           

09:39 Discharge instructions given to patient, Instructed on discharge instructions, follow       

      up and referral plans. Demonstrated understanding of instructions, follow-up care.          

09:41 Patient left the ED.                                                                    jd3 

                                                                                                  

Signatures:                                                                                       

Dispatcher [employer]                           EDMS                                                 

Rose Pearson RN RN ea Davies, Jonathon, RN RN   jd3                                                  

Francisco Taylor RN RN                                                      

Chu Landaverde MD MD   [facility]                                                  

Adiel Wong RN RN   mg2                                                  

Javi Zelaya RN RN   rr5                                                  

Carrington Jean                             2                                                  

Fransico Pink MD MD   7                                                  

                                                                                                  

Corrections: (The following items were deleted from the chart)                                    

08:32 07:30 Reassessment: Patient appears in no apparent distress at this time. No changes    jd3 

      from previously documented assessment. Patient and/or family updated on plan of care        

      and expected duration. Pain level reassessed. Patient is alert, oriented x 3, equal         

      unlabored respirations, skin warm/dry/pink. jd3                                             

08:32 08:31 Reassessment: Patient appears in no apparent distress at this time. No changes    jd3 

      from previously documented assessment. Patient and/or family updated on plan of care        

      and expected duration. Pain level reassessed. Patient is alert, oriented x 3, equal         

      unlabored respirations, skin warm/dry/pink. jd3                                             

                                                                                                  

**************************************************************************************************

## 2021-03-22 NOTE — RAD REPORT
EXAM DESCRIPTION:  CT - Head Brain Wo Cont - 3/21/2021 4:32 am

 

CLINICAL HISTORY:  65 years, Male, TRAUMA

 

COMPARISON:  None.

 

FINDINGS:  Multiple transaxial tomograms of the brain were obtained from the base of the skull to the
 vertex without contrast. 2-D multiplanar reformats and the coronal and sagittal plane were performed
 and reviewed.

An individualized dose optimization technique, Automated Exposure Control, was utilized for the perfo
rmed procedure.

Brain parenchyma demonstrate mild prominence of the sulci and gyri are corresponding to mild cerebral
 and cerebellar atrophy. There is minimal periventricular white matter changes of microvascular ische
jonathan. There is no midline shift and/or mass effect. There is no evidence for acute intracranial hemorr
carlos. There is minimal intracranial vascular calcifications at the siphon Lateral ventricles and cist
erns displace normal appearance.   No intra or extra axial fluid collections were seen. The calvarium
 is intact with no evidence for fracture. The visualized portions of the paranasal sinuses demonstrat
e minimal opacification/fluid level posterior aspect right maxillary sinus. The mastoid air cells and
 orbits demonstrate to be clear.

 

IMPRESSION:  No evidence for acute intracranial hemorrhage or mass effect.

Mild cerebral and cerebellar atrophy with minimal periventricular white matter changes of microvascul
ar ischemia.

 

Electronically signed by:   Alexsander Munoz MD   3/21/2021 1:51 AM CDT Workstation: [id number]

 

 

Due to temporary technical issues with the PACS/Fluency reporting system, reports are being signed by
 the in house radiologist without review as a courtesy to ensure prompt reporting. The interpreting r
adiologist is fully responsible for the content of the report.

## 2021-12-14 ENCOUNTER — HOSPITAL ENCOUNTER (EMERGENCY)
Dept: HOSPITAL 97 - ER | Age: 66
Discharge: ANOTHER HOSPITAL | End: 2021-12-14
Payer: COMMERCIAL

## 2021-12-14 VITALS — TEMPERATURE: 98.6 F | OXYGEN SATURATION: 100 % | DIASTOLIC BLOOD PRESSURE: 99 MMHG | SYSTOLIC BLOOD PRESSURE: 148 MMHG

## 2021-12-14 DIAGNOSIS — K85.90: Primary | ICD-10-CM

## 2021-12-14 DIAGNOSIS — K83.8: ICD-10-CM

## 2021-12-14 DIAGNOSIS — F17.210: ICD-10-CM

## 2021-12-14 DIAGNOSIS — K86.9: ICD-10-CM

## 2021-12-14 LAB
ALBUMIN SERPL BCP-MCNC: 3.6 G/DL (ref 3.4–5)
ALP SERPL-CCNC: 81 U/L (ref 45–117)
ALT SERPL W P-5'-P-CCNC: 23 U/L (ref 12–78)
AST SERPL W P-5'-P-CCNC: 44 U/L (ref 15–37)
BLD SMEAR INTERP: (no result)
BUN BLD-MCNC: 7 MG/DL (ref 7–18)
GLUCOSE SERPLBLD-MCNC: 140 MG/DL (ref 74–106)
HCT VFR BLD CALC: 41.8 % (ref 39.6–49)
LIPASE SERPL-CCNC: 5504 U/L (ref 73–393)
LYMPHOCYTES # SPEC AUTO: 0.6 K/UL (ref 0.7–4.9)
MORPHOLOGY BLD-IMP: (no result)
PMV BLD: 7.2 FL (ref 7.6–11.3)
POTASSIUM SERPL-SCNC: 3.7 MMOL/L (ref 3.5–5.1)
RBC # BLD: 4.62 M/UL (ref 4.33–5.43)

## 2021-12-14 PROCEDURE — 85025 COMPLETE CBC W/AUTO DIFF WBC: CPT

## 2021-12-14 PROCEDURE — 80320 DRUG SCREEN QUANTALCOHOLS: CPT

## 2021-12-14 PROCEDURE — 83690 ASSAY OF LIPASE: CPT

## 2021-12-14 PROCEDURE — 80048 BASIC METABOLIC PNL TOTAL CA: CPT

## 2021-12-14 PROCEDURE — 80076 HEPATIC FUNCTION PANEL: CPT

## 2021-12-14 PROCEDURE — 99285 EMERGENCY DEPT VISIT HI MDM: CPT

## 2021-12-14 PROCEDURE — 36415 COLL VENOUS BLD VENIPUNCTURE: CPT

## 2021-12-14 PROCEDURE — 74177 CT ABD & PELVIS W/CONTRAST: CPT

## 2021-12-14 NOTE — RAD REPORT
EXAM DESCRIPTION:  CT - Abdomen   Pelvis W Contrast - 12/14/2021 12:08 pm

 

CLINICAL HISTORY:  ABD PAIN

 

COMPARISON:  Abdomen   Pelvis W Contrast dated 6/1/2020; Abdomen   Pelvis W Contrast dated 3/6/2019

 

TECHNIQUE:  Biphasic, helical CT imaging of the abdomen and pelvis was performed following 100 ml non
-ionic IV contrast.

 

No oral contrast administered.

 

All CT scans are performed using dose optimization technique as appropriate and may include automated
 exposure control or mA/KV adjustment according to patient size.

 

FINDINGS:  No suspicious findings in the lung bases.

 

Liver is normal size with no focal abnormality of the hepatic parenchyma. No splenomegaly or focal sp
lenic abnormality.

 

Pancreas is grossly abnormal. Patient has a history of pancreatitis. There are numerous pancreatic an
d peripancreatic calcifications present. Prominent dilatation of the pancreatic duct is present progr
essive since June 2020. There significant intrahepatic and extrahepatic biliary tree dilatation now p
resent. Several cystic masses are present along the course of the pancreas. In the head of the pancre
as and poorly defined uncinate process region there is now all an ill-defined poorly demarcated 5.6 c
entimeter masslike area. The dilated common hepatic duct is seen to abruptly taper or transition rachele
g the right lateral margin of this mass. There is also extrinsic compression of the portal vein which
 is dilated from the extrinsic compression. No portal vein thrombus. There are numerous calcification
s within this large mass complex. While the large mass complex is potentially the sequela of acute an
d chronic pancreatitis, the mass is new or enlarged from June and pancreatic malignant process cannot
 be excluded.

 

Symmetric renal function is seen with no hydronephrosis or suspicious renal mass. No pyelonephritis o
r acute parenchymal process. No bladder abnormalities. No adrenal abnormalities.

 

No gastric dilatation. There is mild wall thickening and edema along the course of the duodenum where
 it abuts the large pancreatic mass complex. There is fluid and congested fatty tissue in the peripan
creatic and periportal edema all tissues.  No free air or pneumatosis.  No hernia, mass or bulky lymp
hadenopathy.

 

No suspicious bony findings.

 

 

IMPRESSION:  The patient has developed a very poorly demarcated heterogeneous solid and cystic mass c
omplex in the head and uncinate process region of the pancreas where there is fluid and congested zenia
ma in the surrounding fatty tissues and numerous calcifications within the mass complex.

 

This mass complex is new or significantly enlarged from June and there is no significant intrahepatic
 and extrahepatic biliary tree dilatation from extrinsic compression or invasion of the common bile d
uct.

 

Pancreatic duct obstruction is likely present as well with worsening of the intrahepatic duct dilatat
ion.

 

Extrinsic compression of the dilated portal vein at the junction with the extrinsically compressed jackson
perior mesenteric vein in the normal sized splenic vein.

 

Findings are potentially this sequela of acute and severe chronic pancreatitis. However, the developm
ent of the mass component could indicate a malignant process.

## 2021-12-14 NOTE — XMS REPORT
Continuity of Care Document

                          Created on:2021



Patient:MELVI FIGUEROA

Sex:Male

:1955

External Reference #:[id number]





Demographics







                          Address                   [address]



                                                    [place] [address]

 

                          Home Phone                [phone]

 

                          Mobile Phone              [phone]

 

                          Email Address             NONE

 

                          Preferred Language        English

 

                          Marital Status            Unknown

 

                          Yarsani Affiliation     Unknown

 

                          Race                      Unknown

 

                          Additional Race(s)        White

 

                          Ethnic Group              Not  or 









Author







                          Organization              [facility]

t

 

                          Address                   [address] [place] [address] [address]



                                                    [place] [address]

 

                          Phone                     [phone]









Support







                Name            Relationship    Address         Phone

 

                MD PADILLA MIKE  Emergency Provider [address]  [phone]

[phone]



                                                [place] [address] 

 

                OTHER,  NAME IN NOTES Primary Care Physician Unavailable     Arlene

vailable

 

                FOREST         Family Member   [address]          [phone]



                                                [place] [address] 









Care Team Providers







                    Name                Role                Phone

 

                    BlancaMARGIE           Primary Care Physician [phone]

 

                    Froy CRAVEN,  B       Attending Clinician Unavailable

 

                    EDDIE          Attending Clinician Unavailable

 

                    Riley FLORES       Attending Clinician [phone]

 

                    Aden FLORES            Attending Clinician [phone]

 

                    Pat DO           Attending Clinician [phone]

 

                    Eddie LLANOS       Attending Clinician [phone]

 

                    PAT              Admitting Clinician Unavailable









Payers







           Payer Name Policy Type Policy Number Effective Date Expiration Date S

ource







Advance Directives







           Directive  Decision   Effective  Termination Comments   Source



                                 Date       Date                  

 

           Healthcare Agents on N/A                                         [facility]

[facility]



           [facility]



           Agent                                                  Medical



           [facility]Commun[facility]                                       

      Branch



           [facility]                                         

    



           Agent (Medical Power of                                             



           )[phone] (Mobile)                                       

      







Problems







       Condition Condition Condition Status Onset  Resolution Last   Treating Co

mments 

Source



       Name   Details Category        Date   Date   Treatment Clinician        



                                                 Date                 

 

       Pancreatic Pancreatic Disease Active -                             U

nivers



       mass   mass                 2-02                               ity of



                                   00:00:                             [place]



                                                                    Medical



                                                                      Branch

 

       Thyroid Thyroid Disease Active                              [facility]



       nodule nodule               7-20                               ity of



                                   00:00:                             [place]



                                   00                                 Medical



                                                                      Branch

 

       Bilateral Bilateral Disease Active                              Uni

vers



       lower  lower                7-12                               ity of



       extremity extremity               00:00:                             [place]

[place]



       edema  edema                                                 Medical



                                                                      Branch

 

       Hemoperito Hemoperito Disease Active                              U

clark benson,                                               ity of



       nontraumat nontraumat               00:00:                             [place]

[place]



       ic     ic                   00                                 Medical



                                                                      Branch

 

       Lung mass Lung mass Disease Active                       Overview: 

Univers



                                                           Formattin ity of



                                   00:00:                      g of this [place]



                                   00                          note   Medical



                                                               might be Branch



                                                               different 



                                                               from the 



                                                               original. 



                                                               Added  



                                                               automatic 



                                                               ally from 



                                                               request 



                                                               for    



                                                               surgery 



                                                               [id number] 







Allergies, Adverse Reactions, Alerts







       Allergy Allergy Status Severity Reaction(s) Onset  Inactive Treating Comm

ents 

Source



       Name   Type                        Date   Date   Clinician        

 

       NO KNOWN Drug   Active                                           Univers



       ALLERGIE Class                                                   ity of



       S                                                              [facility]







Social History







           Social Habit Start Date Stop Date  Quantity   Comments   Source

 

           History of tobacco                       Cigarette Smoker            

University of



           use                                                    [facility]

 

           Alcohol intake 2021 1.71 /d               [facility]



                      00:00:00   00:00:00                         [facility]

 

           Cigarettes smoked 2018                       Univers

ity of



           current (pack per 00:00:00   00:00:00                         [place] M

edical



           day) - Reported                                             Branch

 

           Cigarette  2018                       University of



           pack-years 00:00:00   00:00:00                         [facility]

 

           Tobacco use and 2018 Former user            Universi

ty of



           exposure   00:00:00   00:00:00                         [facility]

 

           Sex Assigned At 1955                       Universit

y of



           Birth      00:00:00   00:00:00                         [facility]









                Smoking Status  Start Date      Stop Date       Source

 

                Smoker, current status 2018 00:00:00                 [facility] [place]                                         Branch







Medications







       Ordered Filled Start  Stop   Current Ordering Indication Dosage Frequency

 Signature

                    Comments            Components          Source



     Medication Medication Date Date Medication? Clinician                (SIG) 

          



     Name Name                                                   

 

     pantoprazol      2021      Yes       [id number] 40mg      Take 1          

 Univers



     e 40 mg EC      2-07                               tablet by           ity 

of



     tablet      00:00:                               mouth           [place]



               00                                 daily.           Medical



                                                                 Branch

 

     pantoprazol      2021      Yes       [id number] 40mg      Take 1          

 Univers



     e 40 mg EC      2-07                               tablet by           ity 

of



     tablet      00:00:                               mouth           [place]



               00                                 daily.           Medical



                                                                 Branch

 

     HYDROcodone      2021      Yes       [id number] 1{tbl}      1 tablet,     

      Univers



     -acetaminop      2-06                               Oral,           ity of



     hen (NORCO      17:27:                               Q4HPRN,           [place]

s



     5) 5-325 mg      18                                 Starting           Medi

yovanny



     tablet 1                                         on Mon           Branch



     tablet                                         21 at           



                                                  1127,           



                                                  Until           



                                                  Discontinu           



                                                  ed,            



                                                  Routine,           



                                                  Pain           



                                                  (scale           



                                                  4-6)           

 

     traMADoL 50      2021             50mg      Take 50 mg          

 Univers



     mg tablet                                by mouth           ity o

f



               10:04: 00:00                          every 6           [place]



               12   :00                           (six)           Medical



                                                  hours as           Branch



                                                  needed for           



                                                  Pain           



                                                  (scale           



                                                  4-6).           

 

     tiZANidine      2021             2mg       Take 2 mg           U

nivers



     2 mg tablet                                by mouth           ity

 of



               10:04: 00:00                          at             [place]



               12   :00                           bedtime.           Medical



                                                                 Branch

 

     polyethylen      2021      Yes            17g       17 g,           Unive

rs



     e glycol      2-06                               Oral, BID,           ity o

f



     3350 powder      02:00:                               First dose           

[place]



     17 g      00                                 (after           Medical



                                                  last           Branch



                                                  modificati           



                                                  on) on Sun           



                                                  21 at           



                                                  2000,           



                                                  Until           



                                                  Discontinu           



                                                  ed,            



                                                  Routine           

 

     sennosides-      2021      Yes            1{tbl}      1 tablet,          

 Univers



     docusate      2-06                               Oral, BID,           ity o

f



     sodium      02:00:                               First dose           [place]



     (SENOKOT-S)      00                                 (after           Medica

l



     8.6-50 mg                                         last           Branch



     per tablet                                         modificati           



     1 tablet                                         on) on Sun           



                                                  21 at           



                                                  2000,           



                                                  Until           



                                                  Discontinu           



                                                  ed,            



                                                  Routine           

 

     polyethylen      2021      Yes       [id number] 17g       Take 1          

 Univers



     e glycol      2-06                               Packet by           ity of



     3350 17      00:00:                               mouth 2           [place]



     gram powder      00                                 (two)           Medical



                                                  times           Branch



                                                  daily.           

 

     sennosides-      2021      Yes       [id number] 1{tbl}      Take 1        

   Univers



     docusate      2-06                               tablet by           ity of



     sodium      00:00:                               mouth 2           [place]



     8.6-50 mg      00                                 (two)           Medical



     per tablet                                         times           Branch



                                                  daily.           

 

     polyethylen      2021      Yes       [id number] 17g       Take 1          

 Univers



     e glycol      2-06                               Packet by           ity of



     3350 17      00:00:                               mouth 2           [place]



     gram powder      00                                 (two)           Medical



                                                  times           Branch



                                                  daily.           

 

     sennosides-      2021      Yes       [id number] 1{tbl}      Take 1        

   Univers



     docusate      2-06                               tablet by           ity of



     sodium      00:00:                               mouth 2           [place]



     8.6-50 mg      00                                 (two)           Medical



     per tablet                                         times           Branch



                                                  daily.           

 

     lactated      2021      Yes            1000mL      at 100           Unive

rs



     ringers IV      2-05                               mL/hr,           ity of



     infusion      15:15:                               1,000 mL,           [place]

s



     1,000 mL      00                                 IV             Medical



                                                  Infusion,           Branch



                                                  CONTINUOUS           



                                                  , Starting           



                                                  on Sun           



                                                  21 at           



                                                  0915,           



                                                  Until           



                                                  Discontinu           



                                                  ed,            



                                                  Routine           

 

     polyethylen      2021 No             17g       17 g,           Univ

ers



     e glycol                                Oral,           ity of



     3350 powder      15:00: 17:42                          DAILY,           Ilir

as



     17 g      00   :37                           First dose           Medical



                                                  on Sat           Branch



                                                  21 at           



                                                  0900,           



                                                  Until           



                                                  Discontinu           



                                                  ed,            



                                                  Routine           

 

     morpHINE      2021             2mg       2 mg, Slow           Un

scott



     injection 2      05                          IV Push,           ity

 of



     mg        13:54: 13:53                          Q4HPRN,           [place]



               08   :08                           Starting           Medical



                                                  on Sat           Branch



                                                  21 at           



                                                  0754,           



                                                  Until Sun           



                                                  21 at           



                                                  0753,           



                                                  Routine,           



                                                  Pain           



                                                  (scale           



                                                  7-10)           

 

     HYDROcodone      2021- No             1{tbl}      1 tablet,         

  Univers



     -acetaminop      2- 12-06                          Oral,           ity of



     hen (NORCO      13:54: 13:53                          Q6HPRN,           Ilir

as



     5) 5-325 mg      04   :04                           Starting           Medi

yovanny



     tablet 1                                         on Sat           Branch



     tablet                                         21 at           



                                                  0754,           



                                                  Until Mon           



                                                  21 at           



                                                  0753,           



                                                  Routine,           



                                                  Pain           



                                                  (scale           



                                                  4-6)           

 

     lactated      2021 No             1000mL      at 200           Univ

ers



     ringers IV      2- 12-05                          mL/hr,           ity of



     infusion      22:30: 15:10                          1,000 mL,           Ilir

as



     1,000 mL      00   :42                           IV             Medical



                                                  Infusion,           Branch



                                                  CONTINUOUS           



                                                  , Starting           



                                                  on Fri           



                                                  12/3/21 at           



                                                  1630,           



                                                  Until Sun           



                                                  21 at           



                                                  0910,           



                                                  Routine           

 

     sennosides-      2021 No             1{tbl}      1 tablet,         

  Univers



     docusate      2 12-05                          Oral,           ity of



     sodium      15:00: 17:42                          DAILY,           [place]



     (SENOKOT-S)      00   :37                           First dose           Me

dical



     8.6-50 mg                                         on Fri           Branch



     per tablet                                         12/3/21 at           



     1 tablet                                         0900,           



                                                  Until           



                                                  Discontinu           



                                                  ed,            



                                                  Routine           

 

     morpHINE      2021- No             2mg       2 mg, Slow           Un

scott



     injection 2       1204                          IV Push,           ity

 of



     mg        12:01: 12:00                          Q4HPRN,           [place]



               02   :02                           Starting           Medical



                                                  on Fri           Branch



                                                  12/3/21 at           



                                                  0601,           



                                                  Until Sat           



                                                  21 at           



                                                  0600,           



                                                  Routine,           



                                                  Pain           



                                                  (scale           



                                                  7-10)           

 

     gadobenate      2021- No        [id number] .2mL/kg      11.9 mL      

     Univers



     dimeglumine                                (0.2 mL/kg           i

ty of



     (MULTIHANCE      23:15: 23:15                          ?59.5 kg),          

 [place]



     -15 mL)      00   :00                           Intravenou           Medica

l



     injection                                         s, ONCE, 1           Bran

ch



     11.9 mL                                         dose, On           



                                                  Thu            



                                                  21 at           



                                                  1715,           



                                                  Routine           

 

     enoxaparin      2021      Yes            40mg      40 mg,           Unive

rs



     (LOVENOX)                                     Subcutaneo           ity 

of



     injection      23:00:                               us, DAILY,           [place]

[place]



     40 mg      00                                 First dose           Medical



                                                  on Thu           Branch



                                                  21 at           



                                                  1700,           



                                                  Until           



                                                  Discontinu           



                                                  ed,            



                                                  Routine           

 

     pantoprazol      2021      Yes            40mg      40 mg,           Univ

ers



     e                                        Oral,           ity of



     (PROTONIX)      15:00:                               DAILY,           [place]



     EC tablet      00                                 First dose           Medi

yovanny



     40 mg                                         on Thu           Branch



                                                  21 at           



                                                  0900,           



                                                  Until           



                                                  Discontinu           



                                                  ed,            



                                                  Routine           

 

     NaCl 0.9%      2021- No             1000mL      at 125           Uni

vers



     (NS) IV       1203                          mL/hr, IV           ity of



     infusion      09:15: 21:40                          Infusion,           [place]

as



     1,000 mL      00   :19                           CONTINUOUS           Medic

al



                                                  , Starting           Branch



                                                  on Thu           



                                                  21 at           



                                                  0315,           



                                                  Until Fri           



                                                  12/3/21 at           



                                                  1540,           



                                                  Routine           

 

     ondansetron      2021      Yes            4mg       4 mg, Slow           

[facility]



     (ZOFRAN                                     IV Push,           ity of



     (PF))      09:01:                               Q6HPRN,           [place]



     injection 4      10                                 Starting           Medi

yovanny



     mg                                           on Thu           Branch



                                                  21 at           



                                                  0301,           



                                                  Until           



                                                  Discontinu           



                                                  ed,            



                                                  Routine,           



                                                  Nausea and           



                                                  Vomiting           



                                                  (N/V)           

 

     morpHINE      2021- No             2mg       2 mg, Slow           Un

[facility]



     injection 2                                IV Push,           ity

 of



     mg        09:01: 09:00                          Q4HPRN,           [place]



               02   :02                           Starting           Medical



                                                  on Thu           Branch



                                                  21 at           



                                                  0301,           



                                                  Until Fri           



                                                  12/3/21 at           



                                                  0300,           



                                                  Routine,           



                                                  Pain           



                                                  (scale           



                                                  7-10)           

 

     HYDROcodone      2021- No             1{tbl}      1 tablet,         

  [facility]



     -acetaminop      04                          Oral,           ity of



     hen (NORCO      09:01: 09:00                          Q6HPRN,           Ilir

as



     5) 5-325 mg      00   :00                           Starting           Medi

yovanny



     tablet 1                                         on u           Branch



     tablet                                         21 at           



                                                  0301,           



                                                  Until Sat           



                                                  21 at           



                                                  0300,           



                                                  Routine,           



                                                  Pain           



                                                  (scale           



                                                  4-6)           

 

     acetaminoph      2021      Yes            650mg      650 mg,           Un

[place]



     en                                       Oral,           ity of



     (TYLENOL)      09:00:                               Q6HPRN,           [place]



     tablet 650      59                                 Starting           Medic

al



     mg                                           on u           Branch



                                                  21 at           



                                                  0300,           



                                                  Until           



                                                  Discontinu           



                                                  ed,            



                                                  Routine,           



                                                  Pain           



                                                  (scale           



                                                  1-3)           

 

     HYDROcodone      2021- No             1{tbl}      Take 1           U

nivers



     -acetaminop                                tablet by           it

y of



     hen       00:00: 00:00                          mouth           [place]



     mg tablet      00   :00                           every 8           Medical



                                                  (eight)           Branch



                                                  hours as           



                                                  needed for           



                                                  Pain           



                                                  (scale           



                                                  4-6) or           



                                                  Pain           



                                                  (scale           



                                                  7-10).           

 

     furosemide      2021- No             40mg      Take 1           Univ

ers



     40 mg                                tablet by           ity of



     tablet      00:00: 00:00                          mouth           [place]



               00   :00                           daily.           Medical



                                                                 Branch

 

     albuterol      2021- No             2{puff}      Inhale 2           

Univers



     90                                  Puffs           ity of



     mcg/actuati      00:00: 00:00                          every 6           [place]

[place]



     on inhaler      00   :00                           (six)           Medical



                                                  hours as           Branch



                                                  needed for           



                                                  Wheezing           



                                                  or             



                                                  Shortness           



                                                  of Breath.           

 

     fluticasone      2021- No             1{inhal      Inhale 1         

  Univers



     -salmeterol                      er}       Inhaler           ity 

of



     (AIRDUO      00:00: 00:00                          daily.           [place]



     RESPICLICK)      00   :00                                          Medical



     113-14                                                        Branch



     mcg/actuati                                                        



     on AePB                                                        







Vital Signs







             Vital Name   Observation Time Observation Value Comments     Source

 

             Systolic blood 2021 17:34:00 100 mm[Hg]                Univer

[facility]

 

             Diastolic blood 2021 17:34:00 73 mm[Hg]                 Unive

[facility]

 

             Heart rate   2021 17:34:00 80 /min                   [facility]

 

             Body temperature 2021 17:34:00 36.56 Aileen                 [facility]

 

             Respiratory rate 2021 17:34:00 18 /min                   [facility]

 

             Oxygen saturation in 2021 17:34:00 96 /min                   

[facility]



             Arterial blood by                                        [place] [facility]

yovanny



             Pulse oximetry                                        [place]

 

             Body weight  2021 09:10:00 54.477 kg                 [facility]

 

             BMI          2021 09:10:00 17.23 kg/m2               [facility]

 

             Body height  2021 07:00:00 177.8 cm                  [facility]







Procedures







                Procedure       Date / Time Performed Performing Clinician [facility]

e

 

                BASIC METABOLIC PANEL 2021 16:14:00 Karson Mejía  [facility]



                (NA, K, CL, CO2,                                 [facility]



                GLUCOSE, BUN,                                   



                CREATININE, CA)                                 

 

                POCT GLUCOSE    2021 18:03:00 [facility]



                (AUTOMATED)                                     [facility]

 

                POCT GLUCOSE    2021 12:24:00 [facility]



                (AUTOMATED)                                     [facility]

 

                COMP. METABOLIC PANEL 2021 09:42:00 [facility]



                ([id number])                                         [facility] [place]

 

                CBC WITH DIFF   2021 09:42:00 [facility]

 

                POCT GLUCOSE    2021 06:58:00 [facility]



                (AUTOMATED)                                     [facility]

 

                COMP. METABOLIC PANEL 2021 09:50:00 [facility]



                ([id number])                                         [facility] [place]

 

                CBC WITH DIFF   2021 09:50:00 Jose Stewart [facility]

 

                MR ABDOMEN W WO 2021 23:58:02 Clarke [facility]



                CONTRAST MRCP                                   [facility]

 

                CANCER ANTIGEN-GI (CA 2021 09:12:00 [facility]



                19-9)                                           Medical Branch

 

                LIPASE          2021 09:12:00 [facility]

 

                HEPATIC FUNCTION 2021 09:12:00 [facility]



                PANEL ([id number])                                   [facility]



                (ALB,T.PRO,BILI                                 



                T,BU/BC,ALT,AST,ALK                                 



                PHOS)                                           

 

                BASIC METABOLIC PANEL 2021 09:12:00 [facility]



                (NA, K, CL, CO2,                                 [facility]



                GLUCOSE, BUN,                                   



                CREATININE, CA)                                 

 

                CBC WITH DIFF   2021 09:12:00 [facility]







Encounters







        Start   End     Encounter Admission Attending Care    Care    Encounter 

Source



        Date/Time Date/Time Type    Type    Clinicians Facility Department ID   

   

 

        2021 Transition         LAMONT Mcduffie  [id number]

[id number] [facility]



        00:00:00 00:00:00 of Destiny CEDILLO   [id number]         it

y of



                                                JOHN   [id number]         [place]

[place]



                                                        [phone]         Medi

yovanny



                                                        [address]             Branch

 

        2021 Inpatient [facility]     [id number]

[id number] Univers



        01:23:00 16:45:00                 SAM                           ity [facility]

 

        2021 [facility]         Vaughn Lin [facility]    [id number]

[id number] 

Univers



        01:23:00 16:45:00 [facility]         [facility]  [id number]    

     ity of



                                        Jose Stewart  [id number]      

   [facility]    [phone]   

      [address]                 



                                                ([facility])                   







Results







           Test Description Test Time  Test Comments Results    Result Comments 

Source









                    BASIC METABOLIC PANEL (NA, K, CL, CO2, GLUCOSE, BUN, 2021 16:56:40 



                    CREATININE, CA)                         









                      Test Item  Value      Reference Range Interpretation Comme

nts









             NA (test code = 8135377500) 132 mmol/L   135-145      L            

 

             K (test code = 0564512175) 3.8 mmol/L   3.5-5.0                   

 

             CL (test code = 8634435551) 98 mmol/L                        

 

             CO2 TOTAL (test code = 0509573856) 32 mmol/L    23-31        H     

       

 

             AGAP (test code = 5373954169)              2-16                    

  

 

             BUN (test code = 7735117306) 2 mg/dL      7-23         L           

 

 

             GLUCOSE (test code = 3814638863) 122 mg/dL           H       

     

 

             CREATININE (test code = 0.48 mg/dL   0.60-1.25    L            



             7202801166)                                         

 

             CALCIUM (test code = 3922676714) 8.1 mg/dL    8.6-10.6     L       

     

 

             eGFR (test code = 5785348198)              mL/min/1.73m2           

   

 

             MÓNICA (test code = MÓNICA) Association of Glomerular                    

       



                          Filtration Rate (GFR) and Staging                     

      



                          of Kidney Disease*                           



                          +-----------------------+--------                     

      



                          -------------+-------------------                     

      



                          ------+| GFR (mL/min/1.73 m2) ?|                      

     



                          With Kidney Damage ?| ?Without                        

   



                          Kidney                                 



                          Damage+-----------------------+--                     

      



                          -------------------+-------------                     

      



                          ------------+| ?>90 ? ? ? ? ? ? ?                     

      



                          ? ?| ?Stage one ? ? ? ? ?| ?                          

 



                          Normal ? ? ? ? ? ? ?                           



                          ?+-----------------------+-------                     

      



                          --------------+------------------                     

      



                          -------+| ?60-89 ? ? ? ? ? ? ? ?|                     

      



                          ?Stage two ? ? ? ? ?| ? Decreased                     

      



                          GFR ? ? ? ?                            



                          +-----------------------+--------                     

      



                          -------------+-------------------                     

      



                          ------+| ?30-59 ? ? ? ? ? ? ? ?|                      

     



                          ?Stage three ? ? ? ?| ? Stage                         

  



                          three ? ? ? ? ?                           



                          +-----------------------+--------                     

      



                          -------------+-------------------                     

      



                          ------+| ?15-29 ? ? ? ? ? ? ? ?|                      

     



                          ?Stage four ? ? ? ? | ? Stage                         

  



                          four ? ? ? ? ?                           



                          ?+-----------------------+-------                     

      



                          --------------+------------------                     

      



                          -------+| ?<15 (or dialysis) ? ?|                     

      



                          ?Stage five ? ? ? ? | ? Stage                         

  



                          five ? ? ? ? ?                           



                          ?+-----------------------+-------                     

      



                          --------------+------------------                     

      



                          -------+ *Each stage assumes the                      

     



                          associated GFR level has been in                      

     



                          effect for at least three months.                     

      



                          ?Stages 1 to 5, with or without                       

    



                          kidney disease, indicate chronic                      

     



                          kidney disease. Notes:                           



                          Determination of stages one and                       

    



                          two (with eGFR >59mL/min/1.73 m2)                     

      



                          requires estimation of kidney                         

  



                          damage for at least three months                      

     



                          as defined by structural or                           



                          functional abnormalities of the                       

    



                          kidney, manifested by                           



                          either:Pathological abnormalities                     

      



                          or Markers of kidney damage                           



                          (including abnormalities in the                       

    



                          composition of the blood or urine                     

      



                          or abnormalities in imaging                           



                          tests).                                

 

             Lab Interpretation (test code = Abnormal                           

    



             59029-6)                                            



[facility] GLUCOSE (AUTOMATED)2021 18:50:11





             Test Item    Value        Reference Range Interpretation Comments

 

             POCT GLU (test code = 6834856637) 122 mg/dL           H      

      

 

             Lab Interpretation (test code = Abnormal                           

    



             68182-6)                                            



[facility] GLUCOSE (AUTOMATED)2021 12:27:41





             Test Item    Value        Reference Range Interpretation Comments

 

             POCT GLU (test code = 7191680085) 96 mg/dL                   

      

 

             Lab Interpretation (test code = Normal                             

    



             15764-4)                                            



[id number] 
10:24:39





             Test Item    Value        Reference Range Interpretation Comments

 

             NA (test code = 133 mmol/L   135-145      L            



             1969154407)                                         

 

             K (test code = 3.6 mmol/L   3.5-5.0                   



             8978529846)                                         

 

             CL (test code = 101 mmol/L                       



             3386285752)                                         

 

             CO2 TOTAL (test code = 27 mmol/L    23-31                     



             0298919562)                                         

 

             AGAP (test code =              2-16                      



             2732083407)                                         

 

             BUN (test code = 4 mg/dL      7-23         L            



             8794250402)                                         

 

             GLUCOSE (test code = 105 mg/dL                        



             7187382502)                                         

 

             CREATININE (test code = 0.43 mg/dL   0.60-1.25    L            



             7376788336)                                         

 

             TOTAL BILI (test code = 0.4 mg/dL    0.1-1.1                   



             9799778604)                                         

 

             CALCIUM (test code = 7.8 mg/dL    8.6-10.6     L            



             1522008249)                                         

 

             T PROTEIN (test code = 5.5 g/dL     6.3-8.2      L            



             5600243907)                                         

 

             ALBUMIN (test code = 2.6 g/dL     3.5-5.0      L            



             1420588633)                                         

 

             ALK PHOS (test code = 52 U/L                           



             8104980767)                                         

 

             ALTv (test code = 7 U/L        5-50                      



             1742-6)                                             

 

             AST(SGOT) (test code = 14 U/L       13-40                     



             4367611970)                                         

 

             eGFR (test code =              mL/min/1.73m2              



             1355514119)                                         

 

             MÓNICA (test code = MÓNICA) Association of                           



                          Glomerular Filtration                           



                          Rate (GFR) and Staging                           



                          of Kidney Disease*                           



                          +---------------------                           



                          --+-------------------                           



                          --+-------------------                           



                          ------+| GFR                           



                          (mL/min/1.73 m2) ?|                           



                          With Kidney Damage ?|                           



                          ?Without Kidney                           



                          Damage+---------------                           



                          --------+-------------                           



                          --------+-------------                           



                          ------------+| ?>90 ?                           



                          ? ? ? ? ? ? ? ?|                           



                          ?Stage one ? ? ? ? ?|                           



                          ? Normal ? ? ? ? ? ? ?                           



                          ?+--------------------                           



                          ---+------------------                           



                          ---+------------------                           



                          -------+| ?60-89 ? ? ?                           



                          ? ? ? ? ?| ?Stage two                           



                          ? ? ? ? ?| ? Decreased                           



                          GFR ? ? ? ?                            



                          +---------------------                           



                          --+-------------------                           



                          --+-------------------                           



                          ------+| ?30-59 ? ? ?                           



                          ? ? ? ? ?| ?Stage                           



                          three ? ? ? ?| ? Stage                           



                          three ? ? ? ? ?                           



                          +---------------------                           



                          --+-------------------                           



                          --+-------------------                           



                          ------+| ?15-29 ? ? ?                           



                          ? ? ? ? ?| ?Stage four                           



                          ? ? ? ? | ? Stage four                           



                          ? ? ? ? ?                              



                          ?+--------------------                           



                          ---+------------------                           



                          ---+------------------                           



                          -------+| ?<15 (or                           



                          dialysis) ? ?| ?Stage                           



                          five ? ? ? ? | ? Stage                           



                          five ? ? ? ? ?                           



                          ?+--------------------                           



                          ---+------------------                           



                          ---+------------------                           



                          -------+ *Each stage                           



                          assumes the associated                           



                          GFR level has been in                           



                          effect for at least                           



                          three months. ?Stages                           



                          1 to 5, with or                           



                          without kidney                           



                          disease, indicate                           



                          chronic kidney                           



                          disease. Notes:                           



                          Determination of                           



                          stages one and two                           



                          (with eGFR                             



                          >59mL/min/1.73 m2)                           



                          requires estimation of                           



                          kidney damage for at                           



                          least three months as                           



                          defined by structural                           



                          or functional                           



                          abnormalities of the                           



                          kidney, manifested by                           



                          either:Pathological                           



                          abnormalities or                           



                          Markers of kidney                           



                          damage (including                           



                          abnormalities in the                           



                          composition of the                           



                          blood or urine or                           



                          abnormalities in                           



                          imaging tests).                           

 

             Lab Interpretation Abnormal                               



             (test code = 35546-2)                                        



[facility] WITH [id number] 09:57:54





             Test Item    Value        Reference Range Interpretation Comments

 

             WBC (test code =              See_Comment                [Automated



             1390-2)                                             message] The sy

stem



                                                                 which generated



                                                                 this result



                                                                 transmitted



                                                                 reference range

:



                                                                 4.20 - 10.70



                                                                 10*3/?L. The



                                                                 reference range

 was



                                                                 not used to



                                                                 interpret this



                                                                 result as



                                                                 normal/abnormal

.

 

             RBC (test code =              See_Comment  L             [Automated



             499-8)                                              message] The sy

stem



                                                                 which generated



                                                                 this result



                                                                 transmitted



                                                                 reference range

:



                                                                 4.26 - 5.52



                                                                 10*6/?L. The



                                                                 reference range

 was



                                                                 not used to



                                                                 interpret this



                                                                 result as



                                                                 normal/abnormal

.

 

             HGB (test code = 11.9 g/dL    12.2-16.4    L            



             718-7)                                              

 

             HCT (test code = 36.1 %       38.4-49.3    L            



             4544-3)                                             

 

             MCV (test code = 92.6 fL      81.7-95.6                 



             787-2)                                              

 

             MCH (test code = 30.5 pg      26.1-32.7                 



             785-6)                                              

 

             MCHC (test code = 33.0 g/dL    31.2-35.0                 



             786-4)                                              

 

             RDW-SD (test code = 47.8 fL      38.5-51.6                 



             39090-6)                                            

 

             RDW-CV (test code = 14.0 %       12.1-15.4                 



             788-0)                                              

 

             PLT (test code =              See_Comment                [Automated



             777-3)                                              message] The sy

stem



                                                                 which generated



                                                                 this result



                                                                 transmitted



                                                                 reference range

:



                                                                 150 - 328 10*3/

?L.



                                                                 The reference r

bakari



                                                                 was not used to



                                                                 interpret this



                                                                 result as



                                                                 normal/abnormal

.

 

             MPV (test code = 9.7 fL       9.8-13.0     L            



             72910-0)                                            

 

             NRBC/100 WBC (test              See_Comment                [Automat

ed



             code = 5079034518)                                        message] 

The system



                                                                 which generated



                                                                 this result



                                                                 transmitted



                                                                 reference range

:



                                                                 0.0 - 10.0 /100



                                                                 WBCs. The refer

ence



                                                                 range was not u

sed



                                                                 to interpret th

is



                                                                 result as



                                                                 normal/abnormal

.

 

             NRBC x10^3 (test code <0.01        See_Comment                [Auto

mated



             = [id number])                                        message] The s

ystem



                                                                 which generated



                                                                 this result



                                                                 transmitted



                                                                 reference range

:



                                                                 10*3/?L. The



                                                                 reference range

 was



                                                                 not used to



                                                                 interpret this



                                                                 result as



                                                                 normal/abnormal

.

 

             GRAN MAT (NEUT) % 62.6 %                                 



             (test code = 770-8)                                        

 

             IMM GRAN % (test code 0.50 %                                 



             = 2855782567)                                        

 

             LYMPH % (test code = 18.8 %                                 



             736-9)                                              

 

             MONO % (test code = 14.7 %                                 



             5905-5)                                             

 

             EOS % (test code = 2.9 %                                  



             713-8)                                              

 

             BASO % (test code = 0.5 %                                  



             706-2)                                              

 

             GRAN MAT x10^3(ANC) 5.04 10*3/uL 1.99-6.95                 



             (test code =                                        



             7028675503)                                         

 

             IMM GRAN x10^3 (test 0.04 10*3/uL 0.00-0.06                 



             code = 7564561162)                                        

 

             LYMPH x10^3 (test code 1.51 10*3/uL 1.09-3.23                 



             = 731-0)                                            

 

             MONO x10^3 (test code 1.18 10*3/uL 0.36-1.02    H            



             = 742-7)                                            

 

             EOS x10^3 (test code = 0.23 10*3/uL 0.06-0.53                 



             711-2)                                              

 

             BASO x10^3 (test code 0.04 10*3/uL 0.01-0.09                 



             = 704-7)                                            

 

             Lab Interpretation Abnormal                               



             (test code = 38133-1)                                        



[facility]





             Test Item    Value        Reference Range Interpretation Comments

 

             POCT GLU (test code = 8468072753) 138 mg/dL           H      

      

 

             Lab Interpretation (test code = Abnormal                           

    



             62160-9)                                            



[facility]. METABOLIC PANEL (76055)2021 
11:34:50





             Test Item    Value        Reference Range Interpretation Comments

 

             NA (test code = 130 mmol/L   135-145      L            



             8690545245)                                         

 

             K (test code = 3.9 mmol/L   3.5-5.0                   



             0696174645)                                         

 

             CL (test code = 101 mmol/L                       



             1773505967)                                         

 

             CO2 TOTAL (test code = 21 mmol/L    23-31        L            



             5764327530)                                         

 

             AGAP (test code =              2-16                      



             9530459893)                                         

 

             BUN (test code = 9 mg/dL      7-23                      



             2317373264)                                         

 

             GLUCOSE (test code = 81 mg/dL                         



             6862198445)                                         

 

             CREATININE (test code = 0.51 mg/dL   0.60-1.25    L            



             2226352236)                                         

 

             TOTAL BILI (test code = 0.5 mg/dL    0.1-1.1                   



             7671047755)                                         

 

             CALCIUM (test code = 7.6 mg/dL    8.6-10.6     L            



             9391980131)                                         

 

             T PROTEIN (test code = 5.7 g/dL     6.3-8.2      L            



             3667940266)                                         

 

             ALBUMIN (test code = 2.8 g/dL     3.5-5.0      L            



             0058627998)                                         

 

             ALK PHOS (test code = 56 U/L                           



             8640512700)                                         

 

             ALTv (test code = 6 U/L        5-50                      



             1742-6)                                             

 

             AST(SGOT) (test code = 14 U/L       13-40                     



             9529978046)                                         

 

             eGFR (test code =              mL/min/1.73m2              



             5588657032)                                         

 

             M[employer]NICA (test code = M[employer]) [employer] of                           



                          Glomerular Filtration                           



                          Rate (GFR) and Staging                           



                          of Kidney Disease*                           



                          +---------------------                           



                          --+-------------------                           



                          --+-------------------                           



                          ------+| GFR                           



                          (mL/min/1.73 m2) ?|                           



                          With Kidney Damage ?|                           



                          ?Without Kidney                           



                          Damage+---------------                           



                          --------+-------------                           



                          --------+-------------                           



                          ------------+| ?>90 ?                           



                          ? ? ? ? ? ? ? ?|                           



                          ?Stage one ? ? ? ? ?|                           



                          ? Normal ? ? ? ? ? ? ?                           



                          ?+--------------------                           



                          ---+------------------                           



                          ---+------------------                           



                          -------+| ?60-89 ? ? ?                           



                          ? ? ? ? ?| ?Stage two                           



                          ? ? ? ? ?| ? Decreased                           



                          GFR ? ? ? ?                            



                          +---------------------                           



                          --+-------------------                           



                          --+-------------------                           



                          ------+| ?30-59 ? ? ?                           



                          ? ? ? ? ?| ?Stage                           



                          three ? ? ? ?| ? Stage                           



                          three ? ? ? ? ?                           



                          +---------------------                           



                          --+-------------------                           



                          --+-------------------                           



                          ------+| ?15-29 ? ? ?                           



                          ? ? ? ? ?| ?Stage four                           



                          ? ? ? ? | ? Stage four                           



                          ? ? ? ? ?                              



                          ?+--------------------                           



                          ---+------------------                           



                          ---+------------------                           



                          -------+| ?<15 (or                           



                          dialysis) ? ?| ?Stage                           



                          five ? ? ? ? | ? Stage                           



                          five ? ? ? ? ?                           



                          ?+--------------------                           



                          ---+------------------                           



                          ---+------------------                           



                          -------+ *Each stage                           



                          assumes the associated                           



                          GFR level has been in                           



                          effect for at least                           



                          three months. ?Stages                           



                          1 to 5, with or                           



                          without kidney                           



                          disease, indicate                           



                          chronic kidney                           



                          disease. Notes:                           



                          Determination of                           



                          stages one and two                           



                          (with eGFR                             



                          >59mL/min/1.73 m2)                           



                          requires estimation of                           



                          kidney damage for at                           



                          least three months as                           



                          defined by structural                           



                          or functional                           



                          abnormalities of the                           



                          kidney, manifested by                           



                          either:Pathological                           



                          abnormalities or                           



                          Markers of kidney                           



                          damage (including                           



                          abnormalities in the                           



                          composition of the                           



                          blood or urine or                           



                          abnormalities in                           



                          imaging tests).                           

 

             Lab Interpretation Abnormal                               



             (test code = 93346-7)                                        



[facility] WITH [id number] 10:25:00





             Test Item    Value        Reference Range Interpretation Comments

 

             WBC (test code =              See_Comment                [Automated



             6796-2)                                             message] The sy

stem



                                                                 which generated



                                                                 this result



                                                                 transmitted



                                                                 reference range

:



                                                                 4.20 - 10.70



                                                                 10*3/?L. The



                                                                 reference range

 was



                                                                 not used to



                                                                 interpret this



                                                                 result as



                                                                 normal/abnormal

.

 

             RBC (test code =              See_Comment  L             [Automated



             806-8)                                              message] The sy

stem



                                                                 which generated



                                                                 this result



                                                                 transmitted



                                                                 reference range

:



                                                                 4.26 - 5.52



                                                                 10*6/?L. The



                                                                 reference range

 was



                                                                 not used to



                                                                 interpret this



                                                                 result as



                                                                 normal/abnormal

.

 

             HGB (test code = 10.6 g/dL    12.2-16.4    L            



             718-7)                                              

 

             HCT (test code = 32.8 %       38.4-49.3    L            



             4544-3)                                             

 

             MCV (test code = 92.1 fL      81.7-95.6                 



             787-2)                                              

 

             MCH (test code = 29.8 pg      26.1-32.7                 



             785-6)                                              

 

             MCHC (test code = 32.3 g/dL    31.2-35.0                 



             786-4)                                              

 

             RDW-SD (test code = 48.0 fL      38.5-51.6                 



             79631-4)                                            

 

             RDW-CV (test code = 14.2 %       12.1-15.4                 



             788-0)                                              

 

             PLT (test code =              See_Comment                [Automated



             777-3)                                              message] The sy

stem



                                                                 which generated



                                                                 this result



                                                                 transmitted



                                                                 reference range

:



                                                                 150 - 328 10*3/

?L.



                                                                 The reference r

bakari



                                                                 was not used to



                                                                 interpret this



                                                                 result as



                                                                 normal/abnormal

.

 

             MPV (test code = 10.2 fL      9.8-13.0                  



             97591-9)                                            

 

             NRBC/100 WBC (test              See_Comment                [Automat

ed



             code = [id number])                                        message] 

The system



                                                                 which generated



                                                                 this result



                                                                 transmitted



                                                                 reference range

:



                                                                 0.0 - 10.0 /100



                                                                 WBCs. The refer

ence



                                                                 range was not u

sed



                                                                 to interpret th

is



                                                                 result as



                                                                 normal/abnormal

.

 

             NRBC x10^3 (test code <0.01        See_Comment                [Auto

mated



             = [id number])                                        message] The s

ystem



                                                                 which generated



                                                                 this result



                                                                 transmitted



                                                                 reference range

:



                                                                 10*3/?L. The



                                                                 reference range

 was



                                                                 not used to



                                                                 interpret this



                                                                 result as



                                                                 normal/abnormal

.

 

             GRAN MAT (NEUT) % 74.0 %                                 



             (test code = 770-8)                                        

 

             IMM GRAN % (test code 0.40 %                                 



             = 5009089310)                                        

 

             LYMPH % (test code = 11.9 %                                 



             736-9)                                              

 

             MONO % (test code = 12.0 %                                 



             5905-5)                                             

 

             EOS % (test code = 1.3 %                                  



             713-8)                                              

 

             BASO % (test code = 0.4 %                                  



             706-2)                                              

 

             GRAN MAT x10^3(ANC) 7.36 10*3/uL 1.99-6.95    H            



             (test code =                                        



             1497354986)                                         

 

             IMM GRAN x10^3 (test 0.04 10*3/uL 0.00-0.06                 



             code = 7630122819)                                        

 

             LYMPH x10^3 (test code 1.19 10*3/uL 1.09-3.23                 



             = 731-0)                                            

 

             MONO x10^3 (test code 1.20 10*3/uL 0.36-1.02    H            



             = 742-7)                                            

 

             EOS x10^3 (test code = 0.13 10*3/uL 0.06-0.53                 



             711-2)                                              

 

             BASO x10^3 (test code 0.04 10*3/uL 0.01-0.09                 



             = 704-7)                                            

 

             Lab Interpretation Abnormal                               



             (test code = 28173-9)                                        



[facility]





             Test Item    Value        Reference Range Interpretation Comments

 

             CA 19-9 (test code = 42.7 U/mL    0.0-35.0     H            



             9813873897)                                         

 

             MÓNICA (test code = MÓNICA) Biotin has been                           



                          reported to cause a                           



                          negative bias,                           



                          interpret results                           



                          relative to                            



                          patient's use of                           



                          biotin.                                

 

             Lab Interpretation (test Abnormal                               



             code = 46657-0)                                        



[facility] METABOLIC PANEL (NA, K, CL, CO2, 
GLUCOSE, BUN, CREATININE, CA)2021 10:05:58





             Test Item    Value        Reference Range Interpretation Comments

 

             NA (test code = 132 mmol/L   135-145      L            



             1956643574)                                         

 

             K (test code = 4.3 mmol/L   3.5-5.0                   Slight



             1432282739)                                         hemolysis

 

             CL (test code = 96 mmol/L           L            



             0280692387)                                         

 

             CO2 TOTAL (test code 26 mmol/L    23-31                     



             = 7237632835)                                        

 

             AGAP (test code =              2-16                      



             5448749992)                                         

 

             BUN (test code = 9 mg/dL      7-23                      Slight



             9903738966)                                         hemolysis

 

             GLUCOSE (test code = 113 mg/dL           H            



             3079412733)                                         

 

             CREATININE (test code 0.51 mg/dL   0.60-1.25    L            



             = 1463268624)                                        

 

             CALCIUM (test code = 8.7 mg/dL    8.6-10.6                  



             4911561162)                                         

 

             eGFR (test code =              mL/min/1.73m2              



             7549173453)                                         

 

             MÓNICA (test code = MÓNICA) Association of                           



                          Glomerular                             



                          Filtration Rate                           



                          (GFR) and Staging                           



                          of Kidney Disease*                           



                          +------------------                           



                          -----+-------------                           



                          --------+----------                           



                          ---------------+|                           



                          GFR (mL/min/1.73                           



                          m2) ?| With Kidney                           



                          Damage ?| ?Without                           



                          Kidney                                 



                          Damage+------------                           



                          -----------+-------                           



                          --------------+----                           



                          -------------------                           



                          --+| ?>90 ? ? ? ? ?                           



                          ? ? ? ?| ?Stage one                           



                          ? ? ? ? ?| ? Normal                           



                          ? ? ? ? ? ? ?                           



                          ?+-----------------                           



                          ------+------------                           



                          ---------+---------                           



                          ----------------+|                           



                          ?60-89 ? ? ? ? ? ?                           



                          ? ?| ?Stage two ? ?                           



                          ? ? ?| ? Decreased                           



                          GFR ? ? ? ?                            



                          +------------------                           



                          -----+-------------                           



                          --------+----------                           



                          ---------------+|                           



                          ?30-59 ? ? ? ? ? ?                           



                          ? ?| ?Stage three ?                           



                          ? ? ?| ? Stage                           



                          three ? ? ? ? ?                           



                          +------------------                           



                          -----+-------------                           



                          --------+----------                           



                          ---------------+|                           



                          ?15-29 ? ? ? ? ? ?                           



                          ? ?| ?Stage four ?                           



                          ? ? ? | ? Stage                           



                          four ? ? ? ? ?                           



                          ?+-----------------                           



                          ------+------------                           



                          ---------+---------                           



                          ----------------+|                           



                          ?<15 (or dialysis)                           



                          ? ?| ?Stage five ?                           



                          ? ? ? | ? Stage                           



                          five ? ? ? ? ?                           



                          ?+-----------------                           



                          ------+------------                           



                          ---------+---------                           



                          ----------------+                           



                          *Each stage assumes                           



                          the associated GFR                           



                          level has been in                           



                          effect for at least                           



                          three months.                           



                          ?Stages 1 to 5,                           



                          with or without                           



                          kidney disease,                           



                          indicate chronic                           



                          kidney disease.                           



                          Notes:                                 



                          Determination of                           



                          stages one and two                           



                          (with eGFR                             



                          >59mL/min/1.73 m2)                           



                          requires estimation                           



                          of kidney damage                           



                          for at least three                           



                          months as defined                           



                          by structural or                           



                          functional                             



                          abnormalities of                           



                          the kidney,                            



                          manifested by                           



                          either:Pathological                           



                          abnormalities or                           



                          Markers of kidney                           



                          damage (including                           



                          abnormalities in                           



                          the composition of                           



                          the blood or urine                           



                          or abnormalities in                           



                          imaging tests).                           

 

             Lab Interpretation Abnormal                               



             (test code = 18110-0)                                        



[facility] (ALB,T.PRO,BILI
T,BU/BC,ALT,AST,ALK PHOS)2021 10:05:58





             Test Item    Value        Reference Range Interpretation Comments

 

             TOTAL BILI (test code = 5742342450) 0.8 mg/dL    0.1-1.1           

        

 

             BILI UNCON (test code = 5989341506) 0.4 mg/dL    0.1-1.1           

        

 

             BILI CONJ (test code = 4821048635) 0.0 mg/dL    0.0-0.3            

       

 

             T PROTEIN (test code = 7700168144) 6.9 g/dL     6.3-8.2            

       

 

             ALBUMIN (test code = 4509654459) 3.5 g/dL     3.5-5.0              

     

 

             ALK PHOS (test code = 6510111992) 66 U/L                     

      

 

             ALTv (test code = 1742-6) 9 U/L        5-50                      

 

             AST(SGOT) (test code = 1685162342) 24 U/L       13-40              

       

 

             Lab Interpretation (test code = Normal                             

    



             34689-5)                                            



[facility]





             Test Item    Value        Reference Range Interpretation Comments

 

             LIPASE (test code = 1376403067) 1875 U/L     0-220        H        

    

 

             Lab Interpretation (test code = Abnormal                           

    



             61959-0)                                            



[id number] 09:31:35





             Test Item    Value        Reference Range Interpretation Comments

 

             WBC (test code =              See_Comment  H             [Automated



             3590-2)                                             message] The sy

stem



                                                                 which generated



                                                                 this result



                                                                 transmitted



                                                                 reference range

:



                                                                 4.20 - 10.70



                                                                 10*3/?L. The



                                                                 reference range

 was



                                                                 not used to



                                                                 interpret this



                                                                 result as



                                                                 normal/abnormal

.

 

             RBC (test code =              See_Comment                [Automated



             509-8)                                              message] The sy

stem



                                                                 which generated



                                                                 this result



                                                                 transmitted



                                                                 reference range

:



                                                                 4.26 - 5.52



                                                                 10*6/?L. The



                                                                 reference range

 was



                                                                 not used to



                                                                 interpret this



                                                                 result as



                                                                 normal/abnormal

.

 

             HGB (test code = 13.4 g/dL    12.2-16.4                 



             718-7)                                              

 

             HCT (test code = 40.5 %       38.4-49.3                 



             4544-3)                                             

 

             MCV (test code = 89.6 fL      81.7-95.6                 



             787-2)                                              

 

             MCH (test code = 29.6 pg      26.1-32.7                 



             785-6)                                              

 

             MCHC (test code = 33.1 g/dL    31.2-35.0                 



             786-4)                                              

 

             RDW-SD (test code = 46.7 fL      38.5-51.6                 



             00174-9)                                            

 

             RDW-CV (test code = 14.3 %       12.1-15.4                 



             788-0)                                              

 

             PLT (test code =              See_Comment  H             [Automated



             777-3)                                              message] The sy

stem



                                                                 which generated



                                                                 this result



                                                                 transmitted



                                                                 reference range

:



                                                                 150 - 328 10*3/

?L.



                                                                 The reference r

bakari



                                                                 was not used to



                                                                 interpret this



                                                                 result as



                                                                 normal/abnormal

.

 

             MPV (test code = 9.9 fL       9.8-13.0                  



             54070-7)                                            

 

             NRBC/100 WBC (test              See_Comment                [Automat

ed



             code = 7201191865)                                        message] 

The system



                                                                 which generated



                                                                 this result



                                                                 transmitted



                                                                 reference range

:



                                                                 0.0 - 10.0 /100



                                                                 WBCs. The refer

ence



                                                                 range was not u

sed



                                                                 to interpret th

is



                                                                 result as



                                                                 normal/abnormal

.

 

             NRBC x10^3 (test code <0.01        See_Comment                [Auto

mated



             = 0023624435)                                        message] The s

ystem



                                                                 which generated



                                                                 this result



                                                                 transmitted



                                                                 reference range

:



                                                                 10*3/?L. The



                                                                 reference range

 was



                                                                 not used to



                                                                 interpret this



                                                                 result as



                                                                 normal/abnormal

.

 

             GRAN MAT (NEUT) % 79.8 %                                 



             (test code = 770-8)                                        

 

             IMM GRAN % (test code 0.50 %                                 



             = [id number])                                        

 

             LYMPH % (test code = 8.3 %                                  



             736-9)                                              

 

             MONO % (test code = 10.7 %                                 



             5905-5)                                             

 

             EOS % (test code = 0.3 %                                  



             713-8)                                              

 

             BASO % (test code = 0.4 %                                  



             706-2)                                              

 

             GRAN MAT x10^3(ANC) 9.35 10*3/uL 1.99-6.95    H            



             (test code =                                        



             6050598795)                                         

 

             IMM GRAN x10^3 (test 0.06 10*3/uL 0.00-0.06                 



             code = 4006253324)                                        

 

             LYMPH x10^3 (test code 0.97 10*3/uL 1.09-3.23    L            



             = 731-0)                                            

 

             MONO x10^3 (test code 1.26 10*3/uL 0.36-1.02    H            



             = 742-7)                                            

 

             EOS x10^3 (test code = 0.04 10*3/uL 0.06-0.53    L            



             711-2)                                              

 

             BASO x10^3 (test code 0.05 10*3/uL 0.01-0.09                 



             = 704-7)                                            

 

             Lab Interpretation Abnormal                               



             (test code = 16124-0)                                        



[facility]

## 2021-12-14 NOTE — ER
Nurse's Notes                                                                                     

 [facility]                                                                 

Name: Dandy Garnica                                                                                  

Age: 66 yrs                                                                                       

Sex: Male                                                                                         

: 1955                                                                                   

MRN: [id number]                                                                                   

Arrival Date: 2021                                                                          

Time: 09:50                                                                                       

Account#: [id number]                                                                            

Bed 14                                                                                            

Private MD:                                                                                       

Diagnosis: Acute pancreatitis without necrosis or infection, unspecified;Pancreatic mass with     

  compression and obstruction of biliary system                                                   

                                                                                                  

Presentation:                                                                                     

                                                                                             

09:59 Chief complaint: Patient states: pt states was released x 1wk ago for pancreatitis.     jh6 

      last night started having upper abd pain and nausea. Coronavirus screen: Vaccine            

      status: Patient reports receiving the 1st dose of the Covid vaccine. Ebola Screen: No       

      symptoms or risks identified at this time. Initial Sepsis Screen: Does the patient meet     

      any 2 criteria? No. Patient's initial sepsis screen is negative. Does the patient have      

      a suspected source of infection? No. Patient's initial sepsis screen is negative. Risk      

      Assessment: Do you want to hurt yourself or someone else? Patient reports no desire to      

      harm self or others. Onset of symptoms was 2021.                               

09:59 Method Of Arrival: Ambulatory                                                           [facility] 

09:59 Acuity: LISA 3                                                                           6 

                                                                                                  

Triage Assessment:                                                                                

10:02 General: Appears uncomfortable, ill, Behavior is cooperative, Reports feeling ill for   jh6 

      fatigue for 2-3 days.                                                                       

                                                                                                  

Historical:                                                                                       

                                                                                                  

- Immunization history:: Client reports receiving the 1st dose of the Covid vaccine.              

- Social history:: Smoking status: Patient reports the use of cigarette tobacco                   

  products, denies chronic smoking, but will smoke occasionally.                                  

- Family history:: not pertinent.                                                                 

- Hospitalizations: : No recent hospitalization is reported.                                      

                                                                                                  

                                                                                                  

Screenin:31 Abuse screen: Denies threats or abuse. Nutritional screening: Has had N/V for 3 or more ap3 

      days. Tuberculosis screening: No symptoms or risk factors identified. Fall Risk No fall     

      in past 12 months (0 pts). No secondary diagnosis (0 pts). IV access (20 points).           

      Ambulatory Aid- None/Bed Rest/Nurse Assist (0 pts). Gait- Normal/Bed Rest/Wheelchair (0     

      pts) Mental Status- Oriented to own ability (0 pts). Total Stephenson Fall Scale indicates       

      No Risk (0-24 pts).                                                                         

                                                                                                  

Assessment:                                                                                       

11:28 General: Appears uncomfortable, Behavior is calm, cooperative, appropriate for age.     ap3 

      Pain: Complains of pain in back and abdomen Pain currently is 10 out of 10 on a pain        

      scale. Also complains of decreased appetite, nausea. Neuro: Level of Consciousness is       

      awake, alert, obeys commands, Oriented to person, place, time, situation, Speech is         

      normal. Cardiovascular: Patient's skin is warm and dry. Respiratory: Airway is patent       

      Respiratory effort is even, unlabored, Respiratory pattern is regular, symmetrical. GI:     

      Bowel sounds present X 4 quads. Abd is soft X 4 quads Abdomen is tender to palpation X      

      4 quads. :.                                                                               

13:15 Reassessment: Patient and/or family updated on plan of care and expected duration. Pain ap3 

      level reassessed. Patient is alert, oriented x 3, equal unlabored respirations, skin        

      warm/dry/pink.                                                                              

14:58 Reassessment: Patient and/or family updated on plan of care and expected duration. Pain ap3 

      level reassessed. Patient is alert, oriented x 3, equal unlabored respirations, skin        

      warm/dry/pink. patient states pain is improving.                                            

15:43 Reassessment: report called to [facility].                                           ap3 

16:17 Reassessment: EMS at bedside for transport.                                             ap3 

                                                                                                  

Vital Signs:                                                                                      

09:59  / 81; Pulse 81; Resp 20; Temp 97.4(T); Pulse Ox 97% ; Weight 54.43 kg; Height 5  6 

      ft. 10 in. (177.80 cm); Pain 10/10;                                                         

12:04  / 102; Pulse 73; Pulse Ox 98% on R/A;                                            ap3 

13:25  / 103 LA (auto/reg); Pulse 102; Pulse Ox 99% on R/A; Pain 6/10;                  ap3 

14:28  / 100; Pulse 112; Pulse Ox 97% on R/A;                                           ap3 

14:56  / 90; Pulse 103; Resp 18; Pulse Ox 98% on R/A;                                   ap3 

16:16  / 99; Pulse 97; Temp 98.6(O); Pulse Ox 100% on R/A; Pain 6/10;                   ap3 

09:59 Body Mass Index 17.22 (54.43 kg, 177.80 cm)                                             [facility] 

                                                                                                  

ED Course:                                                                                        

09:50 Patient arrived in ED.                                                                  mr  

10:01 Triage completed.                                                                       jh6 

10:02 Arm band placed on left wrist.                                                          jh6 

11:02 Pola Sharif MD is Attending Physician.                                                rn  

11:02 Pulse ox on. NIBP on.                                                                   ss  

11:18 Nuzhat Castillo, RN is Primary Nurse.                                                  ap3 

11:19 Inserted saline lock: 20 gauge in right forearm, using aseptic technique. Blood         ap3 

      collected.                                                                                  

11:31 Patient has correct armband on for positive identification. Bed in low position. Call   ap3 

      light in reach. Side rails up X2. Door closed. Noise minimized. Warm blanket given.         

12:08 CT Abd/Pelvis - IV Contrast Only In Process Unspecified.                                EDMS

14:02 initiated transfer to [facility], pt was declined due to no beds at any [facility]  

      facility, per Lyndsey.                                                                        

14:03 transfer initiated to [facility] by Nola PEREIRA.                               bd  

14:58 ED physician to see patient.                                                            ap3 

16:05 pt accepted in transfer to [facility] by dr Chi, admin approval given by      hansel Chen.                                                                           

16:18 No provider procedures requiring assistance completed. Patient transferred, IV remains  ap3 

      in place.                                                                                   

                                                                                                  

Administered Medications:                                                                         

11:27 Drug: Zofran (Ondansetron) 4 mg Route: IVP; Site: left forearm;                         ap3 

12:31 Follow up: Response: No adverse reaction                                                ap3 

11:27 Drug: morphine 4 mg {Note: RASS 0.} Route: IVP; Site: left forearm;                     ap3 

12:31 Follow up: Response: No adverse reaction; Pain is decreased                             ap3 

11:28 Drug: NS 0.9% 1000 ml Route: IV; Rate: 1000 ml; Site: left forearm;                     ap3 

14:07 Follow up: IV Status: Completed infusion; IV Intake: 1000ml                             ap3 

14:22 Drug: Dilaudid (HYDROmorphone) 1 mg {Note: rass0.} Route: IVP; Site: right forearm;     ap3 

14:57 Follow up: Response: No adverse reaction; Pain is decreased                             ap3 

14:56 Drug: Rocephin (cefTRIAXone) 1 grams Route: IV; Rate: calculated rate; Site: right      ap3 

      forearm;                                                                                    

14:57 Follow up: IV Status: Completed infusion; IV Intake: 100ml                              ap3 

16:16 Drug: Dilaudid (HYDROmorphone) 1 mg Route: IVP; Site: right forearm;                    ap3 

16:17 Follow up: Response: No adverse reaction                                                ap3 

                                                                                                  

                                                                                                  

Intake:                                                                                           

14:07 IV: 1000ml; Total: 1000ml.                                                              ap3 

14:57 IV: 100ml; Total: 1100ml.                                                               ap3 

                                                                                                  

Outcome:                                                                                          

13:40 ER care complete, transfer ordered by MD.                                               rn  

16:18 Transferred by ground EMS to [facility].                             ap3 

16:18 Condition: stable                                                                           

16:18 Discharge instructions given to patient, Instructed on the need for transfer,               

      Demonstrated understanding of instructions.                                                 

16:20 Patient left the ED.                                                                    ap3 

                                                                                                  

Signatures:                                                                                       

Dispatcher [employer]                           EDMS                                                 

Yanni Hopkins                                                   

Dodson, Josephine                                 mr                                                   

Pola Sharif MD MD rn Smirch, Shelby, RN RN ss Prokisch, Amanda, RN RN   [facility]                                                  

Maryjane Lambert RN RN   [facility]                                                  

                                                                                                  

Corrections: (The following items were deleted from the chart)                                    

10:02 10:01 PMHx: Hypertension; [place] [id number] 

10:02 10:01 PMHx: COPD; [place] [id number] 

10:02 10:01 PMHx: Back pain; [place] [id number] 

10:02 10:01 PMHx: Pancreatitis; [place] [id number] 

                                                                                                  

**************************************************************************************************

## 2021-12-14 NOTE — EDPHYS
Physician Documentation                                                                           

 [facility]                                                                 

Name: Dandy Garnica                                                                                  

Age: 66 yrs                                                                                       

Sex: Male                                                                                         

: 1955                                                                                   

MRN: [id number]                                                                                   

Arrival Date: 2021                                                                          

Time: 09:50                                                                                       

Account#: [id number]                                                                            

Bed 14                                                                                            

Private MD:                                                                                       

ED Physician Pola Sharif                                                                         

HPI:                                                                                              

                                                                                             

11:09 This 66 yrs old Male presents to ER via Ambulatory with complaints of Abdominal Pain.   rn  

11:09 The patient presents with abdominal pain in the epigastric area. Onset: The             rn  

      symptoms/episode began/occurred this morning. The symptoms radiate to back. Associated      

      signs and symptoms: Pertinent positives: nausea, Pertinent negatives: blood in stools,      

      fever. The symptoms are described as intermittent, sharp. Modifying factors: The            

      symptoms are alleviated by nothing, the symptoms are aggravated by touching the area.       

      Severity of pain: At its worst the pain was moderate in the emergency department the        

      pain is unchanged. The patient has experienced similar episodes in the past. The            

      patient has been recently seen by a physician:. Patient reports recently admitted at        

      [facility] for upper abdominal pain, diagnosed with pancreatitis and told might have a            

      pancreatic mass. Reports felt better over the last week but this morning woke up with       

      identical pain in epigastric region. No fever. No hematemesis. No blood in stool.           

      Reports nausea..                                                                            

                                                                                                  

Historical:                                                                                       

                                                                                                  

- Immunization history:: Client reports receiving the 1st dose of the Covid vaccine.              

- Social history:: Smoking status: Patient reports the use of cigarette tobacco                   

  products, denies chronic smoking, but will smoke occasionally.                                  

- Family history:: not pertinent.                                                                 

- Hospitalizations: : No recent hospitalization is reported.                                      

                                                                                                  

                                                                                                  

ROS:                                                                                              

11:09 Constitutional: Negative for fever, chills, and weight loss, Eyes: Negative for injury, rn  

      pain, redness, and discharge, ENT: Negative for injury, pain, and discharge, Neck:          

      Negative for injury, pain, and swelling, Cardiovascular: Negative for chest pain,           

      palpitations, and edema, Respiratory: Negative for shortness of breath, cough,              

      wheezing, and pleuritic chest pain, Abdomen/GI: Negative for nausea, vomiting,              

      diarrhea, and constipation, Back: Negative for injury and pain, : Negative for            

      injury, bleeding, discharge, and swelling, MS/Extremity: Negative for injury and            

      deformity, Skin: Negative for injury, rash, and discoloration, Neuro: Negative for          

      headache, weakness, numbness, tingling, and seizure.                                        

                                                                                                  

Exam:                                                                                             

11:09 Constitutional:  Thin male disheveled Head/Face:  Normocephalic, atraumatic. ENT:  Dry  rn  

      mucous membranes, poor dentition Cardiovascular:  Regular rate and rhythm.  No pulse        

      deficits. Respiratory:  No increased work of breathing, no retractions or nasal             

      flaring. Abdomen/GI:  Soft, moderate epigastric tenderness, no rebound. Skin:  Warm,        

      dry  MS/ Extremity:  Pulses equal, no cyanosis. Neuro:  Awake and alert, GCS 15             

                                                                                                  

Vital Signs:                                                                                      

09:59  / 81; Pulse 81; Resp 20; Temp 97.4(T); Pulse Ox 97% ; Weight 54.43 kg; Height 5  6 

      ft. 10 in. (177.80 cm); Pain 10/10;                                                         

12:04  / 102; Pulse 73; Pulse Ox 98% on R/A;                                            ap3 

13:25  / 103 LA (auto/reg); Pulse 102; Pulse Ox 99% on R/A; Pain 6/10;                  ap3 

14:28  / 100; Pulse 112; Pulse Ox 97% on R/A;                                           ap3 

14:56  / 90; Pulse 103; Resp 18; Pulse Ox 98% on R/A;                                   ap3 

16:16  / 99; Pulse 97; Temp 98.6(O); Pulse Ox 100% on R/A; Pain 6/10;                   ap3 

09:59 Body Mass Index 17.22 (54.43 kg, 177.80 cm)                                             6 

                                                                                                  

MDM:                                                                                              

11:02 Patient medically screened.                                                             rn  

13:38 Differential diagnosis: gastritis, gastroesophageal reflux disease, non-specific abd    rn  

      pain, pancreatitis, Peptic Ulcer Disease, pancreatic mass, pancreatitis. Data reviewed:     

      vital signs, nurses notes, lab test result(s), radiologic studies, CT scan, and as a        

      result, I will admit patient. Counseling: I had a detailed discussion with the patient      

      and/or guardian regarding: the historical points, exam findings, and any diagnostic         

      results supporting the discharge/admit diagnosis, lab results, radiology results, the       

      need to transfer to another facility, for higher level of care, [facility] does not immediately have the required specialist. Response to treatment: the      

      patient's symptoms have mildly improved after treatment, and as a result, I will admit      

      patient. ED course: Patient with acute pancreatitis, also shows pancreatic mass with        

      edema and apparent compression on biliary system. Alk phos and bilirubin within normal      

      range which is kind of strange, but no GI here for further work-up and ongoing pain.        

      Will transfer back to [facility] system..                                                         

14:01 ED course: [facility] at capacity, will transfer to [facility]. .                               rn  

                                                                                                  

                                                                                             

11:03 Order name: Basic Metabolic Panel; Complete Time: 12:25                                 rn  

                                                                                             

11:03 Order name: CBC with Diff; Complete Time: 12:25                                         rn  

                                                                                             

11:03 Order name: Hepatic Function; Complete Time: 12:25                                      rn  

                                                                                             

11:03 Order name: Lipase; Complete Time: 12:25                                                rn  

                                                                                             

11:03 Order name: ETOH Level; Complete Time: 12:25                                            rn  

                                                                                             

12:09 Order name: CBC Smear Scan; Complete Time: 12:25                                        EDMS

                                                                                             

11:03 Order name: CT Abd/Pelvis - IV Contrast Only; Complete Time: 12:53                      rn  

                                                                                             

13:42 Order name: COVID-19 SARS RT PCR (Document "Date of Onset" if Symptomatic)              rn  

                                                                                             

11:03 Order name: IV Saline Lock; Complete Time: 11:                                        rn  

                                                                                             

11:03 Order name: Labs collected and sent; Complete Time: 11:19                               rn  

                                                                                                  

Administered Medications:                                                                         

11:27 Drug: Zofran (Ondansetron) 4 mg Route: IVP; Site: left forearm;                         ap3 

12:31 Follow up: Response: No adverse reaction                                                ap3 

11:27 Drug: morphine 4 mg {Note: RASS 0.} Route: IVP; Site: left forearm;                     ap3 

12:31 Follow up: Response: No adverse reaction; Pain is decreased                             ap3 

11:28 Drug: NS 0.9% 1000 ml Route: IV; Rate: 1000 ml; Site: left forearm;                     ap3 

14:07 Follow up: IV Status: Completed infusion; IV Intake: 1000ml                             ap3 

14:22 Drug: Dilaudid (HYDROmorphone) 1 mg {Note: rass0.} Route: IVP; Site: right forearm;     ap3 

14:57 Follow up: Response: No adverse reaction; Pain is decreased                             ap3 

14:56 Drug: Rocephin (cefTRIAXone) 1 grams Route: IV; Rate: calculated rate; Site: right      ap3 

      forearm;                                                                                    

14:57 Follow up: IV Status: Completed infusion; IV Intake: 100ml                              ap3 

16:16 Drug: Dilaudid (HYDROmorphone) 1 mg Route: IVP; Site: right forearm;                    ap3 

16:17 Follow up: Response: No adverse reaction                                                ap3 

                                                                                                  

                                                                                                  

Disposition Summary:                                                                              

21 13:40                                                                                    

Transfer Ordered                                                                                  

      Reason: Higher level of care                                                            rn  

      Condition: Stable                                                                       rn  

      Problem: an ongoing problem                                                             rn  

      Symptoms: have improved                                                                 rn  

      Transfer Location: [facility](21 14:42)                rn  

      Accepting Physician: Dr. Samaniego(21 16:20)                                          ap3 

      Diagnosis                                                                                   

        - Acute pancreatitis without necrosis or infection, unspecified                       rn  

        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  

      Forms:                                                                                      

        - Medication Reconciliation Form                                                      rn  

        - SBAR form                                                                           rn  

Signatures:                                                                                       

Dispatcher [employer]                           EDPola Jack MD MD rn Prokisch, Amanda, RN RN   3                                                  

Maryjane Lambert RN                   RN   6                                                  

                                                                                                  

Corrections: (The following items were deleted from the chart)                                    

10:02 10:01 PMHx: Hypertension; [place] [id number] 

10:02 10:01 PMHx: COPD; [place] [id number] 

10:02 10:01 PMHx: Back pain; [place] [id number] 

10:02 10:01 PMHx: Pancreatitis; [place] [id number] 

14:42 13:40  rn                                                                            rn  

14:42 13:40 [facility]                                                                    rn  

14:42 14:42 Dr. sanz                                                                            rn  

16:20 14:42 Dr. Samaniego rn                                                                      ap3 

                                                                                                  

**************************************************************************************************